# Patient Record
Sex: FEMALE | Race: WHITE | NOT HISPANIC OR LATINO | Employment: FULL TIME | URBAN - NONMETROPOLITAN AREA
[De-identification: names, ages, dates, MRNs, and addresses within clinical notes are randomized per-mention and may not be internally consistent; named-entity substitution may affect disease eponyms.]

---

## 2017-05-19 ENCOUNTER — APPOINTMENT (OUTPATIENT)
Dept: LAB | Facility: HOSPITAL | Age: 32
End: 2017-05-19

## 2017-05-19 DIAGNOSIS — E53.8 B12 DEFICIENCY: ICD-10-CM

## 2017-05-19 DIAGNOSIS — E06.3 HYPOTHYROIDISM DUE TO HASHIMOTO'S THYROIDITIS: ICD-10-CM

## 2017-05-19 DIAGNOSIS — E03.8 HYPOTHYROIDISM DUE TO HASHIMOTO'S THYROIDITIS: ICD-10-CM

## 2017-05-19 DIAGNOSIS — E55.9 VITAMIN D DEFICIENCY: Primary | ICD-10-CM

## 2017-05-19 LAB
25(OH)D3 SERPL-MCNC: 34.7 NG/ML (ref 30–100)
ALBUMIN SERPL-MCNC: 4.5 G/DL (ref 3.4–4.8)
ALBUMIN/GLOB SERPL: 1.4 G/DL (ref 1.1–1.8)
ALP SERPL-CCNC: 53 U/L (ref 38–126)
ALT SERPL W P-5'-P-CCNC: 22 U/L (ref 9–52)
ANION GAP SERPL CALCULATED.3IONS-SCNC: 10 MMOL/L (ref 5–15)
ARTICHOKE IGE QN: 125 MG/DL (ref 1–129)
AST SERPL-CCNC: 31 U/L (ref 14–36)
BASOPHILS # BLD AUTO: 0.02 10*3/MM3 (ref 0–0.2)
BASOPHILS NFR BLD AUTO: 0.4 % (ref 0–2)
BILIRUB SERPL-MCNC: 0.6 MG/DL (ref 0.2–1.3)
BUN BLD-MCNC: 14 MG/DL (ref 7–21)
BUN/CREAT SERPL: 20 (ref 7–25)
CALCIUM SPEC-SCNC: 9.2 MG/DL (ref 8.4–10.2)
CHLORIDE SERPL-SCNC: 100 MMOL/L (ref 95–110)
CHOLEST SERPL-MCNC: 176 MG/DL (ref 0–199)
CO2 SERPL-SCNC: 26 MMOL/L (ref 22–31)
CREAT BLD-MCNC: 0.7 MG/DL (ref 0.5–1)
DEPRECATED RDW RBC AUTO: 40.1 FL (ref 36.4–46.3)
EOSINOPHIL # BLD AUTO: 0.14 10*3/MM3 (ref 0–0.7)
EOSINOPHIL NFR BLD AUTO: 2.7 % (ref 0–7)
ERYTHROCYTE [DISTWIDTH] IN BLOOD BY AUTOMATED COUNT: 12.2 % (ref 11.5–14.5)
GFR SERPL CREATININE-BSD FRML MDRD: 96 ML/MIN/1.73 (ref 60–149)
GLOBULIN UR ELPH-MCNC: 3.3 GM/DL (ref 2.3–3.5)
GLUCOSE BLD-MCNC: 89 MG/DL (ref 60–100)
HCT VFR BLD AUTO: 40.7 % (ref 35–45)
HDLC SERPL-MCNC: 53 MG/DL (ref 60–200)
HGB BLD-MCNC: 13.7 G/DL (ref 12–15.5)
IMM GRANULOCYTES # BLD: 0.01 10*3/MM3 (ref 0–0.02)
IMM GRANULOCYTES NFR BLD: 0.2 % (ref 0–0.5)
LDLC/HDLC SERPL: 2.11 {RATIO} (ref 0–3.22)
LYMPHOCYTES # BLD AUTO: 1.42 10*3/MM3 (ref 0.6–4.2)
LYMPHOCYTES NFR BLD AUTO: 27.4 % (ref 10–50)
MCH RBC QN AUTO: 30.2 PG (ref 26.5–34)
MCHC RBC AUTO-ENTMCNC: 33.7 G/DL (ref 31.4–36)
MCV RBC AUTO: 89.6 FL (ref 80–98)
MONOCYTES # BLD AUTO: 0.35 10*3/MM3 (ref 0–0.9)
MONOCYTES NFR BLD AUTO: 6.7 % (ref 0–12)
NEUTROPHILS # BLD AUTO: 3.25 10*3/MM3 (ref 2–8.6)
NEUTROPHILS NFR BLD AUTO: 62.6 % (ref 37–80)
PLATELET # BLD AUTO: 172 10*3/MM3 (ref 150–450)
PMV BLD AUTO: 13 FL (ref 8–12)
POTASSIUM BLD-SCNC: 4 MMOL/L (ref 3.5–5.1)
PROT SERPL-MCNC: 7.8 G/DL (ref 6.3–8.6)
RBC # BLD AUTO: 4.54 10*6/MM3 (ref 3.77–5.16)
SODIUM BLD-SCNC: 136 MMOL/L (ref 137–145)
TRIGL SERPL-MCNC: 57 MG/DL (ref 20–199)
TSH SERPL DL<=0.05 MIU/L-ACNC: 0.84 MIU/ML (ref 0.46–4.68)
VIT B12 BLD-MCNC: 334 PG/ML (ref 239–931)
WBC NRBC COR # BLD: 5.19 10*3/MM3 (ref 3.2–9.8)

## 2017-05-19 PROCEDURE — 82306 VITAMIN D 25 HYDROXY: CPT | Performed by: INTERNAL MEDICINE

## 2017-05-19 PROCEDURE — 86304 IMMUNOASSAY TUMOR CA 125: CPT | Performed by: INTERNAL MEDICINE

## 2017-05-19 PROCEDURE — 82607 VITAMIN B-12: CPT | Performed by: INTERNAL MEDICINE

## 2017-05-19 PROCEDURE — 85025 COMPLETE CBC W/AUTO DIFF WBC: CPT | Performed by: INTERNAL MEDICINE

## 2017-05-19 PROCEDURE — 80061 LIPID PANEL: CPT | Performed by: INTERNAL MEDICINE

## 2017-05-19 PROCEDURE — 36415 COLL VENOUS BLD VENIPUNCTURE: CPT | Performed by: INTERNAL MEDICINE

## 2017-05-19 PROCEDURE — 84443 ASSAY THYROID STIM HORMONE: CPT | Performed by: INTERNAL MEDICINE

## 2017-05-19 PROCEDURE — 80053 COMPREHEN METABOLIC PANEL: CPT | Performed by: INTERNAL MEDICINE

## 2017-05-22 RX ORDER — LEVONORGESTREL AND ETHINYL ESTRADIOL 0.1-0.02MG
KIT ORAL
Qty: 28 TABLET | Refills: 11 | OUTPATIENT
Start: 2017-05-22

## 2017-05-23 RX ORDER — LEVONORGESTREL AND ETHINYL ESTRADIOL 0.1-0.02MG
1 KIT ORAL DAILY
Qty: 28 TABLET | Refills: 0 | Status: SHIPPED | OUTPATIENT
Start: 2017-05-23 | End: 2017-06-12 | Stop reason: SINTOL

## 2017-05-24 ENCOUNTER — OFFICE VISIT (OUTPATIENT)
Dept: ENDOCRINOLOGY | Facility: CLINIC | Age: 32
End: 2017-05-24

## 2017-05-24 VITALS
DIASTOLIC BLOOD PRESSURE: 70 MMHG | SYSTOLIC BLOOD PRESSURE: 116 MMHG | HEART RATE: 88 BPM | HEIGHT: 64 IN | WEIGHT: 113.8 LBS | BODY MASS INDEX: 19.43 KG/M2

## 2017-05-24 DIAGNOSIS — E53.8 B12 DEFICIENCY: ICD-10-CM

## 2017-05-24 DIAGNOSIS — E55.9 VITAMIN D DEFICIENCY: Primary | ICD-10-CM

## 2017-05-24 DIAGNOSIS — Z12.73 SCREENING FOR OVARIAN CANCER: ICD-10-CM

## 2017-05-24 DIAGNOSIS — E03.8 HYPOTHYROIDISM DUE TO HASHIMOTO'S THYROIDITIS: ICD-10-CM

## 2017-05-24 DIAGNOSIS — E06.3 HYPOTHYROIDISM DUE TO HASHIMOTO'S THYROIDITIS: ICD-10-CM

## 2017-05-24 PROCEDURE — 99213 OFFICE O/P EST LOW 20 MIN: CPT | Performed by: INTERNAL MEDICINE

## 2017-05-24 RX ORDER — LEVOTHYROXINE AND LIOTHYRONINE 57; 13.5 UG/1; UG/1
45 TABLET ORAL DAILY
Qty: 15 TABLET | Refills: 11 | Status: SHIPPED | OUTPATIENT
Start: 2017-05-24 | End: 2018-05-14 | Stop reason: SDUPTHER

## 2017-05-25 LAB — CANCER AG125 SERPL-ACNC: 12.1 U/ML (ref 0–38.1)

## 2017-06-12 ENCOUNTER — PROCEDURE VISIT (OUTPATIENT)
Dept: FAMILY MEDICINE CLINIC | Facility: CLINIC | Age: 32
End: 2017-06-12

## 2017-06-12 VITALS
HEART RATE: 86 BPM | SYSTOLIC BLOOD PRESSURE: 110 MMHG | TEMPERATURE: 98.9 F | WEIGHT: 112.5 LBS | HEIGHT: 64 IN | DIASTOLIC BLOOD PRESSURE: 60 MMHG | BODY MASS INDEX: 19.21 KG/M2

## 2017-06-12 DIAGNOSIS — R10.2 PELVIC PAIN: ICD-10-CM

## 2017-06-12 DIAGNOSIS — Z01.419 NORMAL GYNECOLOGIC EXAMINATION: Primary | ICD-10-CM

## 2017-06-12 DIAGNOSIS — Z80.41 FAMILY HISTORY OF OVARIAN CANCER: ICD-10-CM

## 2017-06-12 DIAGNOSIS — Z30.41 ENCOUNTER FOR SURVEILLANCE OF CONTRACEPTIVE PILLS: ICD-10-CM

## 2017-06-12 DIAGNOSIS — Z12.4 PAP SMEAR FOR CERVICAL CANCER SCREENING: ICD-10-CM

## 2017-06-12 PROCEDURE — 99395 PREV VISIT EST AGE 18-39: CPT | Performed by: NURSE PRACTITIONER

## 2017-06-12 PROCEDURE — 88142 CYTOPATH C/V THIN LAYER: CPT | Performed by: PATHOLOGY

## 2017-06-12 RX ORDER — NORGESTIMATE AND ETHINYL ESTRADIOL 7DAYSX3 28
1 KIT ORAL DAILY
Qty: 28 TABLET | Refills: 12 | Status: SHIPPED | OUTPATIENT
Start: 2017-06-12 | End: 2018-05-17 | Stop reason: SDUPTHER

## 2017-06-12 NOTE — PROGRESS NOTES
Subjective   Mary Ann Mondragon is a 32 y.o. female. Patient here today with complaints of Gynecologic Exam  Patient here today for Pap smear, well woman examination and follow-up on contraception.  She has been using OCPs however most recently has been having headaches and increased cramping with her periods on these birth control pills.  She wants to consider changing them and or possibly getting an IUD.  She reports family history of ovarian cancer in mother.  Does perform SBEs monthly.  Is not currently sexually active but says that when she last had intercourse 2 years ago she did have some pain with intercourse.  She is  0 para 0  0.  Has hypothyroidism and continues to see Dr. Fabiano Branch for this.  She drinks 1 alcoholic beverage per week, denies tobacco or recreational drug use.  Denies breast, uterine, colon cancer in family.  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    Vitals:    17 0927   BP: 110/60   Pulse: 86   Temp: 98.9 °F (37.2 °C)     Past Medical History:   Diagnosis Date   • Abdominal pain, epigastric    • Acquired hypothyroidism    • Acute pharyngitis    • Carbuncle/boil     left axilla   • Contact dermatitis    • Contraception    • Cough    • Dysmenorrhea    • Dysuria    • Encounter for gynecological examination (general) (routine) without abnormal findings    • Epigastric pain    • Fatigue    • GERD (gastroesophageal reflux disease)    • Hashimoto's thyroiditis    • Heartburn    • Increased frequency of urination    • Knee pain    • Multiple joint pain    • Muscle pain    • Nausea    • Neoplasm of ovary    • Pain in wrist     Right wrist.   • Pelvic pain    • Potential Infectious disease contact    • Special examinations and investigations     Gynecological examination   • Surveillance of oral contraception done    • Syncope    • Upper respiratory infection    • Visit for gynecologic examination    • Vitamin D deficiency    • Vitamin deficiency       Family History   Problem Relation Age of Onset   • Ovarian cancer Other    • Stomach cancer Other    • CHRISTIANA disease Other      Past Surgical History:   Procedure Laterality Date   • ESOPHAGOSCOPY / EGD  2015    Normal esophagus. Gastritis found in the stomach. Biopsy taken. Normal duodenum.   • TONSILLECTOMY AND ADENOIDECTOMY     • TYMPANOSTOMY TUBE PLACEMENT       Social History     Social History   • Marital status: Single     Spouse name: N/A   • Number of children: 0   • Years of education: N/A     Occupational History   • Not on file.     Social History Main Topics   • Smoking status: Never Smoker   • Smokeless tobacco: Never Used   • Alcohol use Yes      Comment: Occasionally   • Drug use: No   • Sexual activity: Not currently     Other Topics Concern   • Not on file     Social History Narrative     Sexual History:       OB History   [id number]     Menstrual History:  OB History     [id number]         Menarche age: 10  Patient's last menstrual period was 2017 (exact date).         Gynecologic Exam   The patient's primary symptoms include pelvic pain and vaginal discharge. The patient's pertinent negatives include no genital itching, genital lesions, genital odor, genital rash, missed menses or vaginal bleeding. This is a recurrent problem. The problem occurs intermittently. The problem has been waxing and waning. The pain is mild. The problem affects both sides. She is not pregnant. Associated symptoms include painful intercourse. Pertinent negatives include no abdominal pain, anorexia, back pain, chills, constipation, diarrhea, discolored urine, dysuria, fever, flank pain, frequency, headaches, hematuria, joint pain, joint swelling, nausea, rash, sore throat, urgency or vomiting. The vaginal discharge was thick and yellow. The vaginal bleeding is typical of menses. Sexual activity: Denies having been sexually active for the last 2 years. It is unknown whether or not her partner has an STD. She uses  oral contraceptives for contraception. Her menstrual history has been regular. There is no history of a  section, an ectopic pregnancy, endometriosis, herpes simplex, menorrhagia, metrorrhagia, miscarriage, ovarian cysts, perineal abscess, PID, an STD, a terminated pregnancy or vaginosis. (Complaints of dysmenorrhea)        The following portions of the patient's history were reviewed and updated as appropriate: allergies, current medications, past family history, past medical history, past social history, past surgical history and problem list.    Review of Systems   Constitutional: Negative.  Negative for chills and fever.   HENT: Negative.  Negative for sore throat.    Eyes: Negative.    Respiratory: Negative.    Cardiovascular: Negative.    Gastrointestinal: Negative.  Negative for abdominal pain, anorexia, constipation, diarrhea, nausea and vomiting.   Endocrine: Negative.    Genitourinary: Positive for pelvic pain and vaginal discharge. Negative for dysuria, flank pain, frequency, hematuria, menorrhagia, missed menses and urgency.   Musculoskeletal: Negative.  Negative for back pain and joint pain.   Skin: Negative.  Negative for rash.   Allergic/Immunologic: Negative.    Neurological: Negative.  Negative for headaches.   Hematological: Negative.    Psychiatric/Behavioral: Negative.        Objective   Physical Exam   Constitutional: She is oriented to person, place, and time. She appears well-developed and well-nourished. No distress.   HENT:   Head: Normocephalic and atraumatic.   Eyes: Conjunctivae and EOM are normal. Right eye exhibits no discharge. Left eye exhibits no discharge.   Neck: Normal range of motion. Neck supple. Normal carotid pulses present. Carotid bruit is not present.   Cardiovascular: Normal rate, regular rhythm, normal heart sounds and intact distal pulses.  Exam reveals no gallop and no friction rub.    No murmur heard.  Pulmonary/Chest: Effort normal and breath sounds normal. No  respiratory distress. She has no wheezes. She has no rales. She exhibits no tenderness. Right breast exhibits no inverted nipple, no mass, no nipple discharge, no skin change and no tenderness. Left breast exhibits no inverted nipple, no mass, no nipple discharge, no skin change and no tenderness. Breasts are symmetrical. There is no breast swelling.   Abdominal: Soft. Bowel sounds are normal. She exhibits no distension and no mass. There is no tenderness. There is no rebound and no guarding. No hernia. Hernia confirmed negative in the right inguinal area and confirmed negative in the left inguinal area.   Genitourinary: Rectum normal and uterus normal. Rectal exam shows no external hemorrhoid, no internal hemorrhoid, no fissure, no mass, no tenderness, anal tone normal and guaiac negative stool. No breast tenderness, discharge or bleeding. Pelvic exam was performed with patient supine. There is no rash, tenderness, lesion or injury on the right labia. There is no rash, tenderness, lesion or injury on the left labia. Uterus is not deviated, not enlarged, not fixed and not tender. Cervix exhibits no motion tenderness, no discharge and no friability. Right adnexum displays tenderness. Right adnexum displays no mass and no fullness. Left adnexum displays tenderness. Left adnexum displays no mass and no fullness. No erythema, tenderness or bleeding in the vagina. No foreign body in the vagina. No signs of injury around the vagina. Vaginal discharge found.   Genitourinary Comments: Complains of pain during bimanual examination   Musculoskeletal: Normal range of motion. She exhibits no edema, tenderness or deformity.   Lymphadenopathy:     She has no cervical adenopathy.        Right: No inguinal adenopathy present.        Left: No inguinal adenopathy present.   Neurological: She is alert and oriented to person, place, and time. She has normal reflexes. She displays normal reflexes. No cranial nerve deficit. She exhibits  normal muscle tone. Coordination normal.   Skin: Skin is warm and dry. No rash noted. She is not diaphoretic. No erythema. No pallor.   Psychiatric: She has a normal mood and affect. Her behavior is normal. Judgment and thought content normal.   Nursing note and vitals reviewed.      Assessment/Plan   Mary Ann was seen today for gynecologic exam.    Diagnoses and all orders for this visit:    Normal gynecologic examination    Pap smear for cervical cancer screening  -     Liquid-based Pap Smear, Screening    Family history of ovarian cancer  Comments:  mother,  at age 59  Orders:  -     US Pelvis Complete    Encounter for surveillance of contraceptive pills    Pelvic pain  -     US Pelvis Complete    Other orders  -     norgestimate-ethinyl estradiol (ORTHO TRI-CYCLEN, 28,) 0.18/0.215/0.25 MG-35 MCG per tablet; Take 1 tablet by mouth Daily.             Lab Results (most recent)     Procedure Component Value Units Date/Time    Liquid-based Pap Smear, Screening [[id number]] Collected:  17 1017    Specimen:  ThinPrep Vial from Cervix Updated:  17 1159        Lab Results (most recent)     Procedure Component Value Units Date/Time    Liquid-based Pap Smear, Screening [[id number]] Collected:  17 1017    Specimen:  ThinPrep Vial from Cervix Updated:  17 1159        Pap is obtained and sent to lab for cytology, she will be informed of results. Will change OCPs, RX sent for ortho tri-cyclen. Offered referral to [facility] for IUD but wants to wait on that referral today. Will call back should she change her mind. pelvid US is ordered since she did have pain during bimanual exam and she does have fam hx of ovarian cancer. She will RTC prn or in 1 year for follow up . she is aware and in agreement to this plan. All questions and concerns are addressed with understanding noted. The patient is in agreement to above plan.

## 2017-06-14 LAB
LAB AP CASE REPORT: NORMAL
LAB AP GYN ADDITIONAL INFORMATION: NORMAL
LAB AP GYN OTHER FINDINGS: NORMAL
Lab: NORMAL
PATH INTERP SPEC-IMP: NORMAL
STAT OF ADQ CVX/VAG CYTO-IMP: NORMAL

## 2018-02-23 ENCOUNTER — LAB (OUTPATIENT)
Dept: LAB | Facility: HOSPITAL | Age: 33
End: 2018-02-23

## 2018-02-23 DIAGNOSIS — E06.3 HYPOTHYROIDISM DUE TO HASHIMOTO'S THYROIDITIS: ICD-10-CM

## 2018-02-23 DIAGNOSIS — E55.9 VITAMIN D DEFICIENCY: ICD-10-CM

## 2018-02-23 DIAGNOSIS — E03.8 HYPOTHYROIDISM DUE TO HASHIMOTO'S THYROIDITIS: ICD-10-CM

## 2018-02-23 DIAGNOSIS — E53.8 B12 DEFICIENCY: ICD-10-CM

## 2018-02-23 DIAGNOSIS — Z12.73 SCREENING FOR OVARIAN CANCER: ICD-10-CM

## 2018-02-23 LAB
25(OH)D3 SERPL-MCNC: 28.4 NG/ML (ref 30–100)
ALBUMIN SERPL-MCNC: 4.5 G/DL (ref 3.4–4.8)
ALBUMIN/GLOB SERPL: 1.4 G/DL (ref 1.1–1.8)
ALP SERPL-CCNC: 54 U/L (ref 38–126)
ALT SERPL W P-5'-P-CCNC: 23 U/L (ref 9–52)
ANION GAP SERPL CALCULATED.3IONS-SCNC: 14 MMOL/L (ref 5–15)
ARTICHOKE IGE QN: 124 MG/DL (ref 1–129)
AST SERPL-CCNC: 25 U/L (ref 14–36)
BASOPHILS # BLD AUTO: 0.02 10*3/MM3 (ref 0–0.2)
BASOPHILS NFR BLD AUTO: 0.4 % (ref 0–2)
BILIRUB SERPL-MCNC: 0.3 MG/DL (ref 0.2–1.3)
BUN BLD-MCNC: 11 MG/DL (ref 7–21)
BUN/CREAT SERPL: 19.3 (ref 7–25)
CALCIUM SPEC-SCNC: 9.3 MG/DL (ref 8.4–10.2)
CHLORIDE SERPL-SCNC: 102 MMOL/L (ref 95–110)
CHOLEST SERPL-MCNC: 195 MG/DL (ref 0–199)
CO2 SERPL-SCNC: 26 MMOL/L (ref 22–31)
CREAT BLD-MCNC: 0.57 MG/DL (ref 0.5–1)
DEPRECATED RDW RBC AUTO: 40.2 FL (ref 36.4–46.3)
EOSINOPHIL # BLD AUTO: 0.1 10*3/MM3 (ref 0–0.7)
EOSINOPHIL NFR BLD AUTO: 2.1 % (ref 0–7)
ERYTHROCYTE [DISTWIDTH] IN BLOOD BY AUTOMATED COUNT: 12.2 % (ref 11.5–14.5)
GFR SERPL CREATININE-BSD FRML MDRD: 121 ML/MIN/1.73 (ref 64–149)
GLOBULIN UR ELPH-MCNC: 3.3 GM/DL (ref 2.3–3.5)
GLUCOSE BLD-MCNC: 81 MG/DL (ref 60–100)
HCT VFR BLD AUTO: 40.6 % (ref 35–45)
HDLC SERPL-MCNC: 65 MG/DL (ref 60–200)
HGB BLD-MCNC: 13.6 G/DL (ref 12–15.5)
IMM GRANULOCYTES # BLD: 0.02 10*3/MM3 (ref 0–0.02)
IMM GRANULOCYTES NFR BLD: 0.4 % (ref 0–0.5)
LDLC/HDLC SERPL: 1.81 {RATIO} (ref 0–3.22)
LYMPHOCYTES # BLD AUTO: 1.5 10*3/MM3 (ref 0.6–4.2)
LYMPHOCYTES NFR BLD AUTO: 31.4 % (ref 10–50)
MCH RBC QN AUTO: 30.2 PG (ref 26.5–34)
MCHC RBC AUTO-ENTMCNC: 33.5 G/DL (ref 31.4–36)
MCV RBC AUTO: 90 FL (ref 80–98)
MONOCYTES # BLD AUTO: 0.26 10*3/MM3 (ref 0–0.9)
MONOCYTES NFR BLD AUTO: 5.5 % (ref 0–12)
NEUTROPHILS # BLD AUTO: 2.87 10*3/MM3 (ref 2–8.6)
NEUTROPHILS NFR BLD AUTO: 60.2 % (ref 37–80)
PLATELET # BLD AUTO: 181 10*3/MM3 (ref 150–450)
PMV BLD AUTO: 12.6 FL (ref 8–12)
POTASSIUM BLD-SCNC: 3.8 MMOL/L (ref 3.5–5.1)
PROT SERPL-MCNC: 7.8 G/DL (ref 6.3–8.6)
RBC # BLD AUTO: 4.51 10*6/MM3 (ref 3.77–5.16)
SODIUM BLD-SCNC: 142 MMOL/L (ref 137–145)
TRIGL SERPL-MCNC: 61 MG/DL (ref 20–199)
TSH SERPL DL<=0.05 MIU/L-ACNC: 1.62 MIU/ML (ref 0.46–4.68)
VIT B12 BLD-MCNC: 285 PG/ML (ref 239–931)
WBC NRBC COR # BLD: 4.77 10*3/MM3 (ref 3.2–9.8)

## 2018-02-23 PROCEDURE — 84443 ASSAY THYROID STIM HORMONE: CPT

## 2018-02-23 PROCEDURE — 82306 VITAMIN D 25 HYDROXY: CPT

## 2018-02-23 PROCEDURE — 36415 COLL VENOUS BLD VENIPUNCTURE: CPT

## 2018-02-23 PROCEDURE — 85025 COMPLETE CBC W/AUTO DIFF WBC: CPT

## 2018-02-23 PROCEDURE — 80053 COMPREHEN METABOLIC PANEL: CPT

## 2018-02-23 PROCEDURE — 82607 VITAMIN B-12: CPT

## 2018-02-23 PROCEDURE — 80061 LIPID PANEL: CPT

## 2018-05-14 ENCOUNTER — OFFICE VISIT (OUTPATIENT)
Dept: ENDOCRINOLOGY | Facility: CLINIC | Age: 33
End: 2018-05-14

## 2018-05-14 VITALS
WEIGHT: 111 LBS | DIASTOLIC BLOOD PRESSURE: 58 MMHG | HEART RATE: 65 BPM | HEIGHT: 64 IN | SYSTOLIC BLOOD PRESSURE: 94 MMHG | OXYGEN SATURATION: 98 % | BODY MASS INDEX: 18.95 KG/M2

## 2018-05-14 DIAGNOSIS — E03.8 HYPOTHYROIDISM DUE TO HASHIMOTO'S THYROIDITIS: ICD-10-CM

## 2018-05-14 DIAGNOSIS — E55.9 VITAMIN D DEFICIENCY: Primary | ICD-10-CM

## 2018-05-14 DIAGNOSIS — E06.3 HYPOTHYROIDISM DUE TO HASHIMOTO'S THYROIDITIS: ICD-10-CM

## 2018-05-14 DIAGNOSIS — Z12.73 SCREENING FOR OVARIAN CANCER: ICD-10-CM

## 2018-05-14 DIAGNOSIS — E53.8 B12 DEFICIENCY: ICD-10-CM

## 2018-05-14 PROCEDURE — 99214 OFFICE O/P EST MOD 30 MIN: CPT | Performed by: INTERNAL MEDICINE

## 2018-05-14 RX ORDER — LEVOTHYROXINE AND LIOTHYRONINE 57; 13.5 UG/1; UG/1
45 TABLET ORAL DAILY
Qty: 15 TABLET | Refills: 11 | Status: SHIPPED | OUTPATIENT
Start: 2018-05-14 | End: 2019-05-16 | Stop reason: SDUPTHER

## 2018-05-14 NOTE — PROGRESS NOTES
Mary Ann Mondragon is a 33 y.o. female who presents for  evaluation of   Chief Complaint   Patient presents with   • Follow-up     no labs done yet       Referring provider    No referring provider defined for this encounter.    Primary Care Provider    Kaylee Osullivan, [facility]    33 yo comes for fu  Hypothyroidism, constant, controlled, mild severity   She is compliant     Past Medical History:   Diagnosis Date   • Abdominal pain, epigastric    • Acquired hypothyroidism    • Acute pharyngitis    • Carbuncle/boil     left axilla   • Contact dermatitis    • Contraception    • Cough    • Dysmenorrhea    • Dysuria    • Encounter for gynecological examination (general) (routine) without abnormal findings    • Epigastric pain    • Fatigue    • GERD (gastroesophageal reflux disease)    • Hashimoto's thyroiditis    • Heartburn    • Increased frequency of urination    • Knee pain    • Multiple joint pain    • Muscle pain    • Nausea    • Neoplasm of ovary    • Pain in wrist     Right wrist.   • Pelvic pain    • Potential Infectious disease contact    • Special examinations and investigations     Gynecological examination   • Surveillance of oral contraception done    • Syncope    • Upper respiratory infection    • Visit for gynecologic examination    • Vitamin D deficiency    • Vitamin deficiency      Family History   Problem Relation Age of Onset   • Ovarian cancer Other    • Stomach cancer Other    • CHRISTIANA disease Other      Social History   Substance Use Topics   • Smoking status: Never Smoker   • Smokeless tobacco: Never Used   • Alcohol use Yes      Comment: Occasionally         Current Outpatient Prescriptions:   •  norgestimate-ethinyl estradiol (ORTHO TRI-CYCLEN, 28,) 0.18/0.215/0.25 MG-35 MCG per tablet, Take 1 tablet by mouth Daily., Disp: 28 tablet, Rfl: 12  •  promethazine (PHENERGAN) 25 MG tablet, Take 25 mg by mouth Every 6 (Six) Hours As Needed., Disp: , Rfl:   •  Thyroid 90 MG PO tablet, Take 0.5 tablets by  "mouth Daily., Disp: 15 tablet, Rfl: 11    Review of Systems    Review of Systems   Constitutional: Negative for activity change, appetite change, chills, diaphoresis, fatigue, fever and unexpected weight change.   HENT: Negative for congestion, dental problem, drooling, ear discharge, ear pain, facial swelling, mouth sores, postnasal drip, rhinorrhea, sinus pressure, sore throat, tinnitus, trouble swallowing and voice change.    Eyes: Negative for photophobia, pain, discharge, redness, itching and visual disturbance.   Respiratory: Negative for apnea, cough, choking, chest tightness, shortness of breath, wheezing and stridor.    Cardiovascular: Negative for chest pain, palpitations and leg swelling.   Gastrointestinal: Negative for abdominal distention, abdominal pain, constipation, diarrhea, nausea and vomiting.   Endocrine: Negative for cold intolerance, heat intolerance, polydipsia, polyphagia and polyuria.   Genitourinary: Negative for decreased urine volume, difficulty urinating, dysuria, flank pain, frequency, hematuria and urgency.   Musculoskeletal: Negative for arthralgias, back pain, gait problem, joint swelling, myalgias, neck pain and neck stiffness.   Skin: Negative for color change, pallor, rash and wound.   Allergic/Immunologic: Negative for immunocompromised state.   Neurological: Negative for dizziness, tremors, seizures, syncope, facial asymmetry, speech difficulty, weakness, light-headedness, numbness and headaches.   Hematological: Negative for adenopathy.   Psychiatric/Behavioral: Negative for agitation, behavioral problems, confusion, decreased concentration, dysphoric mood, hallucinations, self-injury, sleep disturbance and suicidal ideas. The patient is not nervous/anxious and is not hyperactive.         Objective:   BP 94/58 (BP Location: Left arm, Patient Position: Sitting, Cuff Size: Large Adult)   Pulse 65   Ht 162.6 cm (64\")   Wt 50.3 kg (111 lb)   SpO2 98%   BMI 19.05 kg/m² "     Physical Exam   Constitutional: She is oriented to person, place, and time. She appears well-developed.   HENT:   Head: Normocephalic.   Right Ear: External ear normal.   Left Ear: External ear normal.   Nose: Nose normal.   Eyes: Conjunctivae and EOM are normal. No scleral icterus.   Neck: Normal range of motion. Neck supple. No tracheal deviation present. No thyromegaly present.   Cardiovascular: Normal rate, regular rhythm, normal heart sounds and intact distal pulses.  Exam reveals no gallop and no friction rub.    No murmur heard.  Pulmonary/Chest: Effort normal and breath sounds normal. No stridor. No respiratory distress. She has no wheezes. She has no rales. She exhibits no tenderness.   Abdominal: Soft. Bowel sounds are normal. She exhibits no distension and no mass. There is no tenderness. There is no rebound and no guarding.   Musculoskeletal: Normal range of motion. She exhibits no tenderness or deformity.   Lymphadenopathy:     She has no cervical adenopathy.   Neurological: She is alert and oriented to person, place, and time. She displays normal reflexes. She exhibits normal muscle tone. Coordination normal.   Skin: No rash noted. No erythema. No pallor.   Psychiatric: She has a normal mood and affect. Her behavior is normal. Judgment and thought content normal.       Lab Review    Results for orders placed or performed in visit on 02/23/18   CBC Auto Differential   Result Value Ref Range    WBC 4.77 3.20 - 9.80 10*3/mm3    RBC 4.51 3.77 - 5.16 10*6/mm3    Hemoglobin 13.6 12.0 - 15.5 g/dL    Hematocrit 40.6 35.0 - 45.0 %    MCV 90.0 80.0 - 98.0 fL    MCH 30.2 26.5 - 34.0 pg    MCHC 33.5 31.4 - 36.0 g/dL    RDW 12.2 11.5 - 14.5 %    RDW-SD 40.2 36.4 - 46.3 fl    MPV 12.6 (H) 8.0 - 12.0 fL    Platelets 181 150 - 450 10*3/mm3    Neutrophil % 60.2 37.0 - 80.0 %    Lymphocyte % 31.4 10.0 - 50.0 %    Monocyte % 5.5 0.0 - 12.0 %    Eosinophil % 2.1 0.0 - 7.0 %    Basophil % 0.4 0.0 - 2.0 %    Immature  Grans % 0.4 0.0 - 0.5 %    Neutrophils, Absolute 2.87 2.00 - 8.60 10*3/mm3    Lymphocytes, Absolute 1.50 0.60 - 4.20 10*3/mm3    Monocytes, Absolute 0.26 0.00 - 0.90 10*3/mm3    Eosinophils, Absolute 0.10 0.00 - 0.70 10*3/mm3    Basophils, Absolute 0.02 0.00 - 0.20 10*3/mm3    Immature Grans, Absolute 0.02 0.00 - 0.02 10*3/mm3   Comprehensive Metabolic Panel   Result Value Ref Range    Glucose 81 60 - 100 mg/dL    BUN 11 7 - 21 mg/dL    Creatinine 0.57 0.50 - 1.00 mg/dL    Sodium 142 137 - 145 mmol/L    Potassium 3.8 3.5 - 5.1 mmol/L    Chloride 102 95 - 110 mmol/L    CO2 26.0 22.0 - 31.0 mmol/L    Calcium 9.3 8.4 - 10.2 mg/dL    Total Protein 7.8 6.3 - 8.6 g/dL    Albumin 4.50 3.40 - 4.80 g/dL    ALT (SGPT) 23 9 - 52 U/L    AST (SGOT) 25 14 - 36 U/L    Alkaline Phosphatase 54 38 - 126 U/L    Total Bilirubin 0.3 0.2 - 1.3 mg/dL    eGFR Non  Amer 121 64 - 149 mL/min/1.73    Globulin 3.3 2.3 - 3.5 gm/dL    A/G Ratio 1.4 1.1 - 1.8 g/dL    BUN/Creatinine Ratio 19.3 7.0 - 25.0    Anion Gap 14.0 5.0 - 15.0 mmol/L   TSH   Result Value Ref Range    TSH 1.620 0.460 - 4.680 mIU/mL   Vitamin B12   Result Value Ref Range    Vitamin B-12 285 239 - 931 pg/mL   Vitamin D 25 Hydroxy   Result Value Ref Range    25 Hydroxy, Vitamin D 28.4 (L) 30.0 - 100.0 ng/ml   Lipid Panel   Result Value Ref Range    Total Cholesterol 195 0 - 199 mg/dL    Triglycerides 61 20 - 199 mg/dL    HDL Cholesterol 65 60 - 200 mg/dL    LDL Cholesterol  124 1 - 129 mg/dL    LDL/HDL Ratio 1.81 0.00 - 3.22           Assessment/Plan       ICD-10-CM ICD-9-CM   1. Vitamin D deficiency E55.9 268.9   2. B12 deficiency E53.8 266.2   3. Hypothyroidism due to Hashimoto's thyroiditis E03.8 244.8    E06.3 245.2   4. Screening for ovarian cancer Z12.73 V76.46         Bone Health    Lab Results   Component Value Date    CALCIUM 9.3 02/23/2018    [id number] 28.4 (L) 02/23/2018     Do otc 1000 u daily     Thyroid Health    Lab Results   Component Value Date    TSH 1.620  02/23/2018    TSH 0.840 05/19/2017    TSH 1.29 05/09/2016       Lab Results   Component Value Date    FREET4 0.84 09/19/2014    FREET4 0.98 03/21/2014       Lab Results   Component Value Date    A2CTVJA 276 (H) 09/19/2014    C7GJKXZ 242 (H) 03/21/2014       Or armour 90 mg ,taking half a tab       Other Diabetes Related Aspects       Lab Results   Component Value Date    [id number] 285 02/23/2018           Last celiac panel     Lab Results   Component Value Date    GDPABIGA 6 05/09/2016    TTRANSGLIGA <2 05/09/2016     05/09/2016       Screen for ovarian cancer, jessica has history     I reviewed and summarized records from [facility] from 2017 and I reviewed / ordered labs.     No orders of the defined types were placed in this encounter.        A copy of my note was sent to [facility]    Please see my above opinion and suggestions.

## 2018-05-17 RX ORDER — NORGESTIMATE-ETHINYL ESTRADIOL 7DAYSX3 28
1 TABLET ORAL DAILY
Qty: 28 TABLET | Refills: 0 | Status: SHIPPED | OUTPATIENT
Start: 2018-05-17 | End: 2018-07-02 | Stop reason: SDUPTHER

## 2018-05-18 ENCOUNTER — LAB (OUTPATIENT)
Dept: LAB | Facility: HOSPITAL | Age: 33
End: 2018-05-18

## 2018-05-18 DIAGNOSIS — E06.3 HYPOTHYROIDISM DUE TO HASHIMOTO'S THYROIDITIS: ICD-10-CM

## 2018-05-18 DIAGNOSIS — E03.8 HYPOTHYROIDISM DUE TO HASHIMOTO'S THYROIDITIS: ICD-10-CM

## 2018-05-18 DIAGNOSIS — E55.9 VITAMIN D DEFICIENCY: ICD-10-CM

## 2018-05-18 DIAGNOSIS — E53.8 B12 DEFICIENCY: ICD-10-CM

## 2018-05-18 LAB
ALBUMIN SERPL-MCNC: 4.3 G/DL (ref 3.4–4.8)
ALBUMIN/GLOB SERPL: 1.2 G/DL (ref 1.1–1.8)
ALP SERPL-CCNC: 48 U/L (ref 38–126)
ALT SERPL W P-5'-P-CCNC: 22 U/L (ref 9–52)
ANION GAP SERPL CALCULATED.3IONS-SCNC: 10 MMOL/L (ref 5–15)
AST SERPL-CCNC: 41 U/L (ref 14–36)
BASOPHILS # BLD AUTO: 0.02 10*3/MM3 (ref 0–0.2)
BASOPHILS NFR BLD AUTO: 0.4 % (ref 0–2)
BILIRUB SERPL-MCNC: 0.4 MG/DL (ref 0.2–1.3)
BUN BLD-MCNC: 19 MG/DL (ref 7–21)
BUN/CREAT SERPL: 31.1 (ref 7–25)
CALCIUM SPEC-SCNC: 9.5 MG/DL (ref 8.4–10.2)
CHLORIDE SERPL-SCNC: 102 MMOL/L (ref 95–110)
CO2 SERPL-SCNC: 29 MMOL/L (ref 22–31)
CREAT BLD-MCNC: 0.61 MG/DL (ref 0.5–1)
DEPRECATED RDW RBC AUTO: 41.3 FL (ref 36.4–46.3)
EOSINOPHIL # BLD AUTO: 0.16 10*3/MM3 (ref 0–0.7)
EOSINOPHIL NFR BLD AUTO: 3.2 % (ref 0–7)
ERYTHROCYTE [DISTWIDTH] IN BLOOD BY AUTOMATED COUNT: 12.4 % (ref 11.5–14.5)
GFR SERPL CREATININE-BSD FRML MDRD: 112 ML/MIN/1.73 (ref 64–149)
GLOBULIN UR ELPH-MCNC: 3.5 GM/DL (ref 2.3–3.5)
GLUCOSE BLD-MCNC: 85 MG/DL (ref 60–100)
HCT VFR BLD AUTO: 41.1 % (ref 35–45)
HGB BLD-MCNC: 13.7 G/DL (ref 12–15.5)
IMM GRANULOCYTES # BLD: 0.01 10*3/MM3 (ref 0–0.02)
IMM GRANULOCYTES NFR BLD: 0.2 % (ref 0–0.5)
LYMPHOCYTES # BLD AUTO: 1.9 10*3/MM3 (ref 0.6–4.2)
LYMPHOCYTES NFR BLD AUTO: 38.5 % (ref 10–50)
MCH RBC QN AUTO: 30.2 PG (ref 26.5–34)
MCHC RBC AUTO-ENTMCNC: 33.3 G/DL (ref 31.4–36)
MCV RBC AUTO: 90.7 FL (ref 80–98)
MONOCYTES # BLD AUTO: 0.35 10*3/MM3 (ref 0–0.9)
MONOCYTES NFR BLD AUTO: 7.1 % (ref 0–12)
NEUTROPHILS # BLD AUTO: 2.49 10*3/MM3 (ref 2–8.6)
NEUTROPHILS NFR BLD AUTO: 50.6 % (ref 37–80)
PLATELET # BLD AUTO: 189 10*3/MM3 (ref 150–450)
PMV BLD AUTO: 12.6 FL (ref 8–12)
POTASSIUM BLD-SCNC: 4.5 MMOL/L (ref 3.5–5.1)
PROT SERPL-MCNC: 7.8 G/DL (ref 6.3–8.6)
RBC # BLD AUTO: 4.53 10*6/MM3 (ref 3.77–5.16)
SODIUM BLD-SCNC: 141 MMOL/L (ref 137–145)
T4 FREE SERPL-MCNC: 0.64 NG/DL (ref 0.78–2.19)
TSH SERPL DL<=0.05 MIU/L-ACNC: 2.02 MIU/ML (ref 0.46–4.68)
WBC NRBC COR # BLD: 4.93 10*3/MM3 (ref 3.2–9.8)

## 2018-05-18 PROCEDURE — 86304 IMMUNOASSAY TUMOR CA 125: CPT | Performed by: INTERNAL MEDICINE

## 2018-05-18 PROCEDURE — 80053 COMPREHEN METABOLIC PANEL: CPT

## 2018-05-18 PROCEDURE — 84443 ASSAY THYROID STIM HORMONE: CPT

## 2018-05-18 PROCEDURE — 84481 FREE ASSAY (FT-3): CPT

## 2018-05-18 PROCEDURE — 84439 ASSAY OF FREE THYROXINE: CPT

## 2018-05-18 PROCEDURE — 36415 COLL VENOUS BLD VENIPUNCTURE: CPT

## 2018-05-18 PROCEDURE — 85025 COMPLETE CBC W/AUTO DIFF WBC: CPT

## 2018-05-19 DIAGNOSIS — Z12.73 SCREENING FOR OVARIAN CANCER: ICD-10-CM

## 2018-05-19 DIAGNOSIS — E55.9 VITAMIN D DEFICIENCY: Primary | ICD-10-CM

## 2018-05-19 DIAGNOSIS — E53.8 B12 DEFICIENCY: ICD-10-CM

## 2018-05-19 DIAGNOSIS — E06.3 HYPOTHYROIDISM DUE TO HASHIMOTO'S THYROIDITIS: ICD-10-CM

## 2018-05-19 DIAGNOSIS — E03.8 HYPOTHYROIDISM DUE TO HASHIMOTO'S THYROIDITIS: ICD-10-CM

## 2018-05-19 LAB
CANCER AG125 SERPL-ACNC: 12.8 U/ML (ref 0–38.1)
T3FREE SERPL-MCNC: 3.4 PG/ML (ref 2–4.4)

## 2018-06-11 RX ORDER — NORGESTIMATE-ETHINYL ESTRADIOL 7DAYSX3 28
1 TABLET ORAL DAILY
Qty: 28 TABLET | Refills: 0 | OUTPATIENT
Start: 2018-06-11 | End: 2019-06-11

## 2018-07-02 ENCOUNTER — OFFICE VISIT (OUTPATIENT)
Dept: FAMILY MEDICINE CLINIC | Facility: CLINIC | Age: 33
End: 2018-07-02

## 2018-07-02 ENCOUNTER — RESULTS ENCOUNTER (OUTPATIENT)
Dept: FAMILY MEDICINE CLINIC | Facility: CLINIC | Age: 33
End: 2018-07-02

## 2018-07-02 VITALS
DIASTOLIC BLOOD PRESSURE: 62 MMHG | HEIGHT: 64 IN | SYSTOLIC BLOOD PRESSURE: 110 MMHG | BODY MASS INDEX: 18.51 KG/M2 | WEIGHT: 108.4 LBS | HEART RATE: 58 BPM

## 2018-07-02 DIAGNOSIS — Z12.11 COLON CANCER SCREENING: ICD-10-CM

## 2018-07-02 DIAGNOSIS — Z01.419 WELL WOMAN EXAM WITH ROUTINE GYNECOLOGICAL EXAM: Primary | ICD-10-CM

## 2018-07-02 DIAGNOSIS — Z12.4 PAP SMEAR FOR CERVICAL CANCER SCREENING: ICD-10-CM

## 2018-07-02 PROCEDURE — 99395 PREV VISIT EST AGE 18-39: CPT | Performed by: NURSE PRACTITIONER

## 2018-07-02 RX ORDER — PROMETHAZINE HYDROCHLORIDE 25 MG/1
25 TABLET ORAL EVERY 6 HOURS PRN
Qty: 20 TABLET | Refills: 0 | Status: SHIPPED | OUTPATIENT
Start: 2018-07-02 | End: 2019-06-10

## 2018-07-02 RX ORDER — NORGESTIMATE AND ETHINYL ESTRADIOL 7DAYSX3 28
1 KIT ORAL DAILY
Qty: 28 TABLET | Refills: 12 | Status: SHIPPED | OUTPATIENT
Start: 2018-07-02 | End: 2018-07-02 | Stop reason: SDUPTHER

## 2018-07-02 RX ORDER — NORGESTIMATE AND ETHINYL ESTRADIOL 7DAYSX3 28
1 KIT ORAL DAILY
Qty: 28 TABLET | Refills: 11 | Status: SHIPPED | OUTPATIENT
Start: 2018-07-02 | End: 2019-05-12 | Stop reason: SDUPTHER

## 2018-07-02 NOTE — PROGRESS NOTES
Subjective   Mary Ann Mondragon is a 33 y.o. female. Patient here today with complaints of Gynecologic Exam  Patient here today for well woman examination with Pap.  LMP 6/13/18.  Reports menarche at age 11.  Has family history of ovarian cancer in mother, colon cancer in great grandfather.  Denies being sexually active.  Requests cologard due to family history.  Reports mole on right breast she would like to have evaluated.  Continues on OCPs which is working well, tolerating well and she needs refills on this today.  Patient exercises daily, runs daily and bikes as well.  Reports good appetite.    Vitals:    07/02/18 0903   BP: 110/62   Pulse: 58     Past Medical History:   Diagnosis Date   • Abdominal pain, epigastric    • Acquired hypothyroidism    • Acute pharyngitis    • Carbuncle/boil     left axilla   • Contact dermatitis    • Contraception    • Cough    • Dysmenorrhea    • Dysuria    • Encounter for gynecological examination (general) (routine) without abnormal findings    • Epigastric pain    • Fatigue    • GERD (gastroesophageal reflux disease)    • Hashimoto's thyroiditis    • Heartburn    • Increased frequency of urination    • Knee pain    • Multiple joint pain    • Muscle pain    • Nausea    • Neoplasm of ovary    • Pain in wrist     Right wrist.   • Pelvic pain    • Potential Infectious disease contact    • Special examinations and investigations     Gynecological examination   • Surveillance of oral contraception done    • Syncope    • Upper respiratory infection    • Visit for gynecologic examination    • Vitamin D deficiency    • Vitamin deficiency      Family History   Problem Relation Age of Onset   • Ovarian cancer Other    • Stomach cancer Other    • CHRISTIANA disease Other      Past Surgical History:   Procedure Laterality Date   • ESOPHAGOSCOPY / EGD  06/08/2015    Normal esophagus. Gastritis found in the stomach. Biopsy taken. Normal duodenum.   • TONSILLECTOMY AND ADENOIDECTOMY     •  TYMPANOSTOMY TUBE PLACEMENT       Social History     Social History   • Marital status: Single     Spouse name: N/A   • Number of children: 0   • Years of education: Teaches college english      Occupational History   • Not on file.     Social History Main Topics   • Smoking status: Never Smoker   • Smokeless tobacco: Never Used   • Alcohol use Yes      Comment: Occasionally   • Drug use: denies   • Sexual activity: No     Other Topics Concern   • Not on file     Social History Narrative   • No narrative on file     Sexual History:       OB History   [id number]     Menstrual History:  OB History     [id number]         Menarche age: 11  Patient's last menstrual period was 2018 (approximate).         Gynecologic Exam   The patient's primary symptoms include a genital rash and vaginal discharge. The patient's pertinent negatives include no genital itching, genital lesions, genital odor, missed menses, pelvic pain or vaginal bleeding. This is a recurrent problem. The problem occurs intermittently. The patient is experiencing no pain. She is not pregnant. Associated symptoms include nausea (has off and on ). Pertinent negatives include no abdominal pain, anorexia, back pain, chills, constipation, diarrhea, discolored urine, dysuria, fever, flank pain, frequency, hematuria, joint pain, joint swelling, painful intercourse, rash, sore throat, urgency or vomiting. The vaginal discharge was yellow. The vaginal bleeding is typical of menses. She has not been passing clots. She has not been passing tissue. She is not sexually active. She uses oral contraceptives for contraception. Her menstrual history has been regular. There is no history of a  section, an ectopic pregnancy, an STD or a terminated pregnancy.        The following portions of the patient's history were reviewed and updated as appropriate: allergies, current medications, past family history, past medical history, past social history, past surgical history  and problem list.    Review of Systems   Constitutional: Negative.  Negative for chills and fever.   HENT: Negative.  Negative for sore throat.    Eyes: Negative.    Respiratory: Negative.    Cardiovascular: Negative.    Gastrointestinal: Positive for nausea (has off and on ). Negative for abdominal pain, anorexia, constipation, diarrhea and vomiting.   Endocrine: Negative.    Genitourinary: Positive for vaginal discharge. Negative for dysuria, flank pain, frequency, hematuria, missed menses, pelvic pain and urgency.   Musculoskeletal: Negative.  Negative for back pain and joint pain.   Skin: Negative.  Negative for rash.   Allergic/Immunologic: Negative.    Neurological: Negative.    Hematological: Negative.    Psychiatric/Behavioral: Negative.        Objective   Physical Exam   Constitutional: She is oriented to person, place, and time. She appears well-developed and well-nourished. No distress.   HENT:   Head: Normocephalic and atraumatic.   Nose: Nose normal.   Mouth/Throat: Oropharynx is clear and moist.   Eyes: Conjunctivae and EOM are normal. Right eye exhibits no discharge. Left eye exhibits no discharge.   Neck: Normal range of motion. Neck supple. No thyromegaly present.   Cardiovascular: Normal rate, regular rhythm, normal heart sounds and intact distal pulses.  Exam reveals no gallop and no friction rub.    No murmur heard.  Pulmonary/Chest: Effort normal and breath sounds normal. No respiratory distress. She has no wheezes. She has no rales. She exhibits no tenderness. Right breast exhibits no inverted nipple, no mass, no nipple discharge, no skin change and no tenderness. Left breast exhibits no inverted nipple, no mass, no nipple discharge, no skin change and no tenderness. Breasts are symmetrical. There is no breast swelling.       Mole, lateral R breast, not itching, bleeding, not changing in color/size   Abdominal: Soft. Bowel sounds are normal. She exhibits no distension and no mass. There is no  tenderness. There is no rebound and no guarding. No hernia. Hernia confirmed negative in the right inguinal area and confirmed negative in the left inguinal area.   Genitourinary: Uterus normal. No breast tenderness, discharge or bleeding. Pelvic exam was performed with patient supine. There is no rash, tenderness, lesion or injury on the right labia. There is no rash, tenderness, lesion or injury on the left labia. Uterus is not deviated, not enlarged, not fixed and not tender. Cervix exhibits no motion tenderness, no discharge and no friability. Right adnexum displays no mass, no tenderness and no fullness. Left adnexum displays no mass, no tenderness and no fullness. No erythema, tenderness or bleeding in the vagina. No foreign body in the vagina. No signs of injury around the vagina. Vaginal discharge found.   Genitourinary Comments: Rectal exam deferred today, copious amt of yellowish, thick discharge noted on exam, exam limited due to pt being uncomfortable , not due to pain however, pt not able to relax during exam even with small speculum. Pt reports not sexually active currently    Musculoskeletal: Normal range of motion. She exhibits no edema, tenderness or deformity.   Lymphadenopathy:     She has no cervical adenopathy.        Right: No inguinal adenopathy present.        Left: No inguinal adenopathy present.   Neurological: She is alert and oriented to person, place, and time. She has normal reflexes. She displays normal reflexes. No cranial nerve deficit. She exhibits normal muscle tone. Coordination normal.   Skin: Skin is warm and dry. No rash noted. She is not diaphoretic. No erythema. No pallor.   Psychiatric: She has a normal mood and affect. Her behavior is normal. Judgment and thought content normal.   Nursing note and vitals reviewed.      Assessment/Plan   Mary Ann was seen today for gynecologic exam.    Diagnoses and all orders for this visit:    Well woman exam with routine gynecological  exam    Pap smear for cervical cancer screening  -     Liquid-based Pap Smear, Screening    Colon cancer screening  -     Cologuard - Stool, Per Rectum; Future    Other orders  -     Discontinue: norgestimate-ethinyl estradiol (TRINESSA, 28,) 0.18/0.215/0.25 MG-35 MCG per tablet; Take 1 tablet by mouth Daily.  -     promethazine (PHENERGAN) 25 MG tablet; Take 1 tablet by mouth Every 6 (Six) Hours As Needed for Nausea or Vomiting.  -     norgestimate-ethinyl estradiol (TRINESSA, 28,) 0.18/0.215/0.25 MG-35 MCG per tablet; Take 1 tablet by mouth Daily.    Pap is obtained and the left for cytology.  She'll be informed of results, I will treat vaginal discharge/Pap accordingly depending on test results   I will refill OCPs for her and Phenergan for her as above.  She will follow-up in one year here for recheck or sooner as needed   She is aware and is in agreement to this plan   cologard is ordered for her per her request due to family history of ovarian cancer and colon cancer.    Patient's Body mass index is 18.61 kg/m². BMI is underweight.   All questions and concerns are addressed with understanding noted.

## 2018-07-05 ENCOUNTER — TELEPHONE (OUTPATIENT)
Dept: FAMILY MEDICINE CLINIC | Facility: CLINIC | Age: 33
End: 2018-07-05

## 2018-07-05 LAB
LAB AP CASE REPORT: NORMAL
LAB AP GYN ADDITIONAL INFORMATION: NORMAL
PATH INTERP SPEC-IMP: NORMAL
STAT OF ADQ CVX/VAG CYTO-IMP: NORMAL

## 2018-10-18 ENCOUNTER — OFFICE VISIT (OUTPATIENT)
Dept: FAMILY MEDICINE CLINIC | Facility: CLINIC | Age: 33
End: 2018-10-18

## 2018-10-18 VITALS
HEIGHT: 64 IN | DIASTOLIC BLOOD PRESSURE: 66 MMHG | OXYGEN SATURATION: 100 % | HEART RATE: 67 BPM | BODY MASS INDEX: 19.36 KG/M2 | SYSTOLIC BLOOD PRESSURE: 114 MMHG | WEIGHT: 113.4 LBS

## 2018-10-18 DIAGNOSIS — R07.89 CHEST WALL PAIN: Primary | ICD-10-CM

## 2018-10-18 DIAGNOSIS — R00.2 PALPITATIONS: ICD-10-CM

## 2018-10-18 DIAGNOSIS — R00.0 TACHYCARDIA: ICD-10-CM

## 2018-10-18 PROCEDURE — 93000 ELECTROCARDIOGRAM COMPLETE: CPT | Performed by: NURSE PRACTITIONER

## 2018-10-18 PROCEDURE — 99213 OFFICE O/P EST LOW 20 MIN: CPT | Performed by: NURSE PRACTITIONER

## 2018-10-22 DIAGNOSIS — R00.2 PALPITATIONS: Primary | ICD-10-CM

## 2018-10-22 PROCEDURE — 93000 ELECTROCARDIOGRAM COMPLETE: CPT | Performed by: NURSE PRACTITIONER

## 2018-10-25 NOTE — PROGRESS NOTES
Subjective   Mary Ann Mondragon is a 33 y.o. female. Patient here today with complaints of Rapid Heart Rate and Chest Pain  Patient here today relating that she woke up this morning with her heart racing did not sleep well last night, heart rate was in the 90s to low 100s for about 30 minutes she recorded this on her fit bed.  She only drinks approximately 1-1-1/2 cups of coffee per day, during the episode she felt her chest was tight but she rested and was taking the breast and her complaints went away on their own.  She does see Dr. Mario for Hashimoto's thyroiditis.  Most recent thyroid labs were drawn in May 2018    Vitals:    10/18/18 1333   BP: 114/66   Pulse: 67   SpO2: 100%     Past Medical History:   Diagnosis Date   • Abdominal pain, epigastric    • Acquired hypothyroidism    • Acute pharyngitis    • Carbuncle/boil     left axilla   • Contact dermatitis    • Contraception    • Cough    • Dysmenorrhea    • Dysuria    • Encounter for gynecological examination (general) (routine) without abnormal findings    • Epigastric pain    • Fatigue    • GERD (gastroesophageal reflux disease)    • Hashimoto's thyroiditis    • Heartburn    • Increased frequency of urination    • Knee pain    • Multiple joint pain    • Muscle pain    • Nausea    • Neoplasm of ovary    • Pain in wrist     Right wrist.   • Pelvic pain    • Potential Infectious disease contact    • Special examinations and investigations     Gynecological examination   • Surveillance of oral contraception done    • Syncope    • Upper respiratory infection    • Visit for gynecologic examination    • Vitamin D deficiency    • Vitamin deficiency      Palpitations    This is a new problem. The current episode started yesterday. The problem occurs rarely. The problem has been resolved. On average, each episode lasts 30 minutes. The symptoms are aggravated by caffeine and thyroid drugs (OCPs). Associated symptoms include chest fullness (Resolved at  present), an irregular heartbeat (Resolved at present) and shortness of breath (Resolved at present). Pertinent negatives include no anxiety, chest pain, coughing, diaphoresis, dizziness, fever, malaise/fatigue, nausea, near-syncope, numbness, syncope, vomiting or weakness. She has tried bed rest for the symptoms. The treatment provided mild relief. There are no known risk factors. Her past medical history is significant for hyperthyroidism. There is no history of drug use.        The following portions of the patient's history were reviewed and updated as appropriate: allergies, current medications, past family history, past medical history, past social history, past surgical history and problem list.    Review of Systems   Constitutional: Negative.  Negative for diaphoresis, fever and malaise/fatigue.   HENT: Negative.    Eyes: Negative.    Respiratory: Positive for shortness of breath (Resolved at present). Negative for cough.    Cardiovascular: Positive for palpitations. Negative for chest pain, syncope and near-syncope.   Gastrointestinal: Negative.  Negative for nausea and vomiting.   Endocrine: Negative.    Genitourinary: Negative.    Musculoskeletal: Negative.    Skin: Negative.    Allergic/Immunologic: Negative.    Neurological: Negative.  Negative for dizziness, weakness and numbness.   Hematological: Negative.    Psychiatric/Behavioral: Negative.  The patient is not nervous/anxious.        Objective     Physical Exam   Constitutional: She is oriented to person, place, and time. She appears well-developed and well-nourished. No distress.   HENT:   Head: Normocephalic and atraumatic.   Neck: Normal carotid pulses present. Carotid bruit is not present.   Cardiovascular: Normal rate, regular rhythm, normal heart sounds and intact distal pulses.  Exam reveals no gallop and no friction rub.    No murmur heard.  Pulmonary/Chest: Effort normal and breath sounds normal. No respiratory distress. She has no wheezes.  She has no rales.   Musculoskeletal: She exhibits no edema.   Neurological: She is alert and oriented to person, place, and time.   Skin: Skin is warm and dry. No rash noted. She is not diaphoretic. No erythema. No pallor.   Psychiatric: She has a normal mood and affect. Her behavior is normal.   Nursing note and vitals reviewed.      Assessment/Plan   Mary Ann was seen today for rapid heart rate and chest pain.    Diagnoses and all orders for this visit:    Chest wall pain  -     XR Chest 2 View    Tachycardia  -     XR Chest 2 View    Palpitations    EKG completed in office, chest x-ray obtained and she'll be informed of results   Labs reviewed with her and she will follow-up with endocrinology for further evaluation  Advised to return here, urgent care, ER if symptoms should return   Advised decrease caffeine use, advised adequate sleep/rest   She is aware and is in agreement to this plan   All questions and concerns are addressed with understanding noted.     Procedures

## 2018-11-05 ENCOUNTER — OFFICE VISIT (OUTPATIENT)
Dept: FAMILY MEDICINE CLINIC | Facility: CLINIC | Age: 33
End: 2018-11-05

## 2018-11-05 VITALS
TEMPERATURE: 99.6 F | HEIGHT: 64 IN | WEIGHT: 113 LBS | OXYGEN SATURATION: 99 % | HEART RATE: 88 BPM | BODY MASS INDEX: 19.29 KG/M2 | SYSTOLIC BLOOD PRESSURE: 118 MMHG | DIASTOLIC BLOOD PRESSURE: 72 MMHG

## 2018-11-05 DIAGNOSIS — J06.9 ACUTE URI: Primary | ICD-10-CM

## 2018-11-05 DIAGNOSIS — J02.9 ACUTE PHARYNGITIS, UNSPECIFIED ETIOLOGY: ICD-10-CM

## 2018-11-05 PROCEDURE — 99213 OFFICE O/P EST LOW 20 MIN: CPT | Performed by: NURSE PRACTITIONER

## 2018-11-05 RX ORDER — PSEUDOEPHEDRINE HCL 120 MG/1
120 TABLET, FILM COATED, EXTENDED RELEASE ORAL EVERY 12 HOURS
Qty: 30 TABLET | Refills: 0 | Status: SHIPPED | OUTPATIENT
Start: 2018-11-05 | End: 2019-03-18

## 2018-11-05 RX ORDER — AZITHROMYCIN 250 MG/1
TABLET, FILM COATED ORAL
Qty: 6 TABLET | Refills: 0 | Status: SHIPPED | OUTPATIENT
Start: 2018-11-05 | End: 2019-03-18

## 2018-11-05 NOTE — PROGRESS NOTES
Subjective   Mary Ann Mondragon is a 33 y.o. female. Patient here today with complaints of Fever (Went to  on saturday ); Sore Throat; and Cough  pt here today with complaints of sore throat,m fever, hoarseness, started last Friday, was seen at urgent care and had neg strep and flu and both were negative, did have flu shot last Tuesday. Does have headache in evenings. Was given steroid inj which helped short term but now symptoms have returned. tmax 101    Vitals:    11/05/18 1024   BP: 118/72   Pulse: 88   Temp: 99.6 °F (37.6 °C)   SpO2: 99%     Past Medical History:   Diagnosis Date   • Abdominal pain, epigastric    • Acquired hypothyroidism    • Acute pharyngitis    • Carbuncle/boil     left axilla   • Contact dermatitis    • Contraception    • Cough    • Dysmenorrhea    • Dysuria    • Encounter for gynecological examination (general) (routine) without abnormal findings    • Epigastric pain    • Fatigue    • GERD (gastroesophageal reflux disease)    • Hashimoto's thyroiditis    • Heartburn    • Increased frequency of urination    • Knee pain    • Multiple joint pain    • Muscle pain    • Nausea    • Neoplasm of ovary    • Pain in wrist     Right wrist.   • Pelvic pain    • Potential Infectious disease contact    • Special examinations and investigations     Gynecological examination   • Surveillance of oral contraception done    • Syncope    • Upper respiratory infection    • Visit for gynecologic examination    • Vitamin D deficiency    • Vitamin deficiency      Sore Throat    This is a new problem. The current episode started in the past 7 days. The problem has been unchanged. Neither side of throat is experiencing more pain than the other. The maximum temperature recorded prior to her arrival was 100 - 100.9 F. The fever has been present for 1 to 2 days. The pain is at a severity of 4/10. Associated symptoms include congestion, coughing, ear pain, headaches, a hoarse voice, shortness of breath, swollen  glands and trouble swallowing. Pertinent negatives include no abdominal pain, diarrhea, drooling, ear discharge, plugged ear sensation, neck pain, stridor or vomiting. She has had no exposure to strep or mono. She has tried nothing for the symptoms. The treatment provided no relief.        The following portions of the patient's history were reviewed and updated as appropriate: allergies, current medications, past family history, past medical history, past social history, past surgical history and problem list.    Review of Systems   Constitutional: Negative.    HENT: Positive for congestion, ear pain, hoarse voice, sore throat and trouble swallowing. Negative for drooling and ear discharge.    Eyes: Negative.    Respiratory: Positive for cough and shortness of breath. Negative for stridor.    Cardiovascular: Negative.    Gastrointestinal: Negative.  Negative for abdominal pain, diarrhea and vomiting.   Endocrine: Negative.    Genitourinary: Negative.    Musculoskeletal: Negative.  Negative for neck pain.   Skin: Negative.    Allergic/Immunologic: Negative.    Neurological: Positive for headaches.   Hematological: Negative.    Psychiatric/Behavioral: Negative.        Objective   Physical Exam   Constitutional: She is oriented to person, place, and time. She appears well-developed and well-nourished. No distress.   HENT:   Head: Normocephalic and atraumatic.   Right Ear: Tympanic membrane is bulging.   Left Ear: Tympanic membrane is bulging.   Nose: Mucosal edema and rhinorrhea present. Right sinus exhibits no maxillary sinus tenderness and no frontal sinus tenderness. Left sinus exhibits no maxillary sinus tenderness and no frontal sinus tenderness.   Mouth/Throat: Uvula is midline and mucous membranes are normal. Posterior oropharyngeal erythema present. No tonsillar exudate.   Neck: Neck supple.   Cardiovascular: Normal rate, regular rhythm and normal heart sounds.  Exam reveals no gallop and no friction rub.     No murmur heard.  Pulmonary/Chest: Effort normal and breath sounds normal. No respiratory distress. She has no wheezes. She has no rales.   Lymphadenopathy:     She has cervical adenopathy.   Neurological: She is alert and oriented to person, place, and time.   Skin: Skin is warm and dry. No rash noted. She is not diaphoretic. No erythema. No pallor.   Psychiatric: She has a normal mood and affect. Her behavior is normal.   Nursing note and vitals reviewed.      Assessment/Plan   Mary Ann was seen today for fever, sore throat and cough.    Diagnoses and all orders for this visit:    Acute URI    Acute pharyngitis, unspecified etiology    Other orders  -     azithromycin (ZITHROMAX Z-HEAVEN) 250 MG tablet; Take 2 tablets the first day, then 1 tablet daily for 4 days.  -     pseudoephedrine (SUDAFED 12 HOUR) 120 MG 12 hr tablet; Take 1 tablet by mouth Every 12 (Twelve) Hours.      Advised to push fluids, gargle with warm salt water, is given sudafed and zithromax pk as above  If her symptoms persist/worsen she is to RTC for recheck  Work excuse given to her for today and tomorrow  She is aware and is in agreement to this mo  All questions and concerns are addressed with understanding noted.

## 2019-03-18 ENCOUNTER — OFFICE VISIT (OUTPATIENT)
Dept: FAMILY MEDICINE CLINIC | Facility: CLINIC | Age: 34
End: 2019-03-18

## 2019-03-18 VITALS
WEIGHT: 116.2 LBS | HEIGHT: 64 IN | HEART RATE: 81 BPM | BODY MASS INDEX: 19.84 KG/M2 | DIASTOLIC BLOOD PRESSURE: 70 MMHG | SYSTOLIC BLOOD PRESSURE: 120 MMHG

## 2019-03-18 DIAGNOSIS — Z20.2 EXPOSURE TO GENITAL HERPES: Primary | ICD-10-CM

## 2019-03-18 DIAGNOSIS — Z70.9 SEX COUNSELING, UNSPECIFIED: ICD-10-CM

## 2019-03-18 PROCEDURE — 99213 OFFICE O/P EST LOW 20 MIN: CPT | Performed by: NURSE PRACTITIONER

## 2019-03-19 NOTE — PROGRESS NOTES
"Subjective   Mary Ann Mondragon is a 34 y.o. female. Patient here today with complaints of Personal Issues  Patient here today to discuss \"personal issues \", states she is in a relationship with a man who has hx of genital herpes and she has questions and concerns to discuss today. Wants to discuss best options for her best gyn health. Pt without symptoms    Vitals:    03/18/19 1322   BP: 120/70   Pulse: 81     Past Medical History:   Diagnosis Date   • Abdominal pain, epigastric    • Acquired hypothyroidism    • Acute pharyngitis    • Carbuncle/boil     left axilla   • Contact dermatitis    • Contraception    • Cough    • Dysmenorrhea    • Dysuria    • Encounter for gynecological examination (general) (routine) without abnormal findings    • Epigastric pain    • Fatigue    • GERD (gastroesophageal reflux disease)    • Hashimoto's thyroiditis    • Heartburn    • Increased frequency of urination    • Knee pain    • Multiple joint pain    • Muscle pain    • Nausea    • Neoplasm of ovary    • Pain in wrist     Right wrist.   • Pelvic pain    • Potential Infectious disease contact    • Special examinations and investigations     Gynecological examination   • Surveillance of oral contraception done    • Syncope    • Upper respiratory infection    • Visit for gynecologic examination    • Vitamin D deficiency    • Vitamin deficiency      History of Present Illness     The following portions of the patient's history were reviewed and updated as appropriate: allergies, current medications, past family history, past medical history, past social history, past surgical history and problem list.    Review of Systems   Constitutional: Negative.    HENT: Negative.    Eyes: Negative.    Respiratory: Negative.    Cardiovascular: Negative.    Gastrointestinal: Negative.    Endocrine: Negative.    Genitourinary: Negative.    Musculoskeletal: Negative.    Skin: Negative.    Allergic/Immunologic: Negative.    Neurological: " Negative.    Hematological: Negative.    Psychiatric/Behavioral: Negative.        Objective   Physical Exam   Constitutional: She is oriented to person, place, and time. She appears well-developed and well-nourished. No distress.   Cardiovascular: Normal rate.   Pulmonary/Chest: Effort normal.   Neurological: She is alert and oriented to person, place, and time.   Skin: Skin is warm and dry. She is not diaphoretic.   Psychiatric: She has a normal mood and affect. Her behavior is normal. Judgment and thought content normal.   Nursing note and vitals reviewed.      Assessment/Plan   Mary Ann was seen today for personal issues.    Diagnoses and all orders for this visit:    Exposure to genital herpes    Sex counseling, unspecified    we had a long discussion regarding safe sex practices, written and verbal information given to her regarding herpes, type 2. She is advised to use condoms if she chooses to begin a sexual relationship with this new partner. She is informed of what signs and symptoms to watch for in her body.  She is aware and is in agreement to this plan. All questions and concerns are addressed with understanding noted. She will RTC as scheduled for chronic conditions or prn.

## 2019-05-13 RX ORDER — NORGESTIMATE AND ETHINYL ESTRADIOL 7DAYSX3 28
KIT ORAL
Qty: 84 TABLET | Refills: 4 | Status: SHIPPED | OUTPATIENT
Start: 2019-05-13 | End: 2019-06-26 | Stop reason: SDUPTHER

## 2019-05-16 RX ORDER — LEVOTHYROXINE AND LIOTHYRONINE 57; 13.5 UG/1; UG/1
45 TABLET ORAL DAILY
Qty: 15 TABLET | Refills: 1 | Status: SHIPPED | OUTPATIENT
Start: 2019-05-16 | End: 2019-06-03 | Stop reason: SDUPTHER

## 2019-06-03 RX ORDER — THYROID,PORK 90 MG
TABLET ORAL
Qty: 15 TABLET | Refills: 0 | Status: SHIPPED | OUTPATIENT
Start: 2019-06-03 | End: 2019-06-27 | Stop reason: SDUPTHER

## 2019-06-10 ENCOUNTER — OFFICE VISIT (OUTPATIENT)
Dept: FAMILY MEDICINE CLINIC | Facility: CLINIC | Age: 34
End: 2019-06-10

## 2019-06-10 VITALS
DIASTOLIC BLOOD PRESSURE: 68 MMHG | HEIGHT: 64 IN | BODY MASS INDEX: 19.81 KG/M2 | TEMPERATURE: 98.4 F | SYSTOLIC BLOOD PRESSURE: 110 MMHG | WEIGHT: 116 LBS | HEART RATE: 88 BPM

## 2019-06-10 DIAGNOSIS — Z20.2 EXPOSURE TO GENITAL HERPES: ICD-10-CM

## 2019-06-10 DIAGNOSIS — N76.0 ACUTE VAGINITIS: Primary | ICD-10-CM

## 2019-06-10 PROCEDURE — 99213 OFFICE O/P EST LOW 20 MIN: CPT | Performed by: NURSE PRACTITIONER

## 2019-06-10 RX ORDER — FLUCONAZOLE 200 MG/1
200 TABLET ORAL DAILY
Qty: 2 TABLET | Refills: 0 | Status: SHIPPED | OUTPATIENT
Start: 2019-06-10 | End: 2019-06-20

## 2019-06-20 ENCOUNTER — OFFICE VISIT (OUTPATIENT)
Dept: FAMILY MEDICINE CLINIC | Facility: CLINIC | Age: 34
End: 2019-06-20

## 2019-06-20 VITALS
BODY MASS INDEX: 19.5 KG/M2 | TEMPERATURE: 97 F | HEART RATE: 83 BPM | RESPIRATION RATE: 18 BRPM | HEIGHT: 64 IN | SYSTOLIC BLOOD PRESSURE: 118 MMHG | WEIGHT: 114.2 LBS | DIASTOLIC BLOOD PRESSURE: 68 MMHG | OXYGEN SATURATION: 99 %

## 2019-06-20 DIAGNOSIS — N89.8 VAGINAL ITCHING: Primary | ICD-10-CM

## 2019-06-20 DIAGNOSIS — Z20.828 EXPOSURE TO HERPES SIMPLEX VIRUS (HSV): ICD-10-CM

## 2019-06-20 DIAGNOSIS — B37.49 CANDIDA INFECTION OF GENITAL REGION: ICD-10-CM

## 2019-06-20 LAB
BACTERIA UR QL AUTO: ABNORMAL /HPF
BILIRUB UR QL STRIP: NEGATIVE
CANDIDA ALBICANS: NEGATIVE
CLARITY UR: CLEAR
COLOR UR: YELLOW
GARDNERELLA VAGINALIS: POSITIVE
GLUCOSE UR STRIP-MCNC: NEGATIVE MG/DL
HGB UR QL STRIP.AUTO: NEGATIVE
HYALINE CASTS UR QL AUTO: ABNORMAL /LPF
KETONES UR QL STRIP: NEGATIVE
LEUKOCYTE ESTERASE UR QL STRIP.AUTO: ABNORMAL
NITRITE UR QL STRIP: NEGATIVE
PH UR STRIP.AUTO: 7 [PH] (ref 5.5–8)
PROT UR QL STRIP: ABNORMAL
RBC # UR: ABNORMAL /HPF
REF LAB TEST METHOD: ABNORMAL
SP GR UR STRIP: 1.01 (ref 1–1.03)
SQUAMOUS #/AREA URNS HPF: ABNORMAL /HPF
T VAGINALIS DNA VAG QL PROBE+SIG AMP: NEGATIVE
UROBILINOGEN UR QL STRIP: ABNORMAL
WBC UR QL AUTO: ABNORMAL /HPF

## 2019-06-20 PROCEDURE — 87510 GARDNER VAG DNA DIR PROBE: CPT | Performed by: PHYSICIAN ASSISTANT

## 2019-06-20 PROCEDURE — 99213 OFFICE O/P EST LOW 20 MIN: CPT | Performed by: PHYSICIAN ASSISTANT

## 2019-06-20 PROCEDURE — 87480 CANDIDA DNA DIR PROBE: CPT | Performed by: PHYSICIAN ASSISTANT

## 2019-06-20 PROCEDURE — 81001 URINALYSIS AUTO W/SCOPE: CPT | Performed by: PHYSICIAN ASSISTANT

## 2019-06-20 PROCEDURE — 87660 TRICHOMONAS VAGIN DIR PROBE: CPT | Performed by: PHYSICIAN ASSISTANT

## 2019-06-20 RX ORDER — NYSTATIN 100000 U/G
CREAM TOPICAL 2 TIMES DAILY
Qty: 30 G | Refills: 0 | Status: SHIPPED | OUTPATIENT
Start: 2019-06-20 | End: 2019-06-26

## 2019-06-20 NOTE — PROGRESS NOTES
Subjective   Mary Ann Mondragon is a 34 y.o. female.   Pt is new to me.   Vaginitis   This is a new problem. The current episode started in the past 7 days. The problem occurs constantly. The problem has been unchanged. Associated symptoms include a rash (bilateral labia, erythematous). Pertinent negatives include no abdominal pain, anorexia, arthralgias, change in bowel habit, chest pain, chills, congestion, coughing, diaphoresis, fatigue, fever, headaches, joint swelling, myalgias, nausea, neck pain, numbness, sore throat, swollen glands, urinary symptoms, vertigo, visual change, vomiting or weakness. Exacerbated by: urination.   Female  Problem   The patient's primary symptoms include genital itching, genital lesions, a genital odor, a genital rash and vaginal discharge (serous ). The patient's pertinent negatives include no missed menses, pelvic pain or vaginal bleeding. This is a recurrent problem. The current episode started yesterday. The problem occurs constantly. The problem has been unchanged. The pain is mild. The problem affects the right side. She is not pregnant. Associated symptoms include rash (bilateral labia, erythematous). Pertinent negatives include no abdominal pain, anorexia, back pain, chills, constipation, diarrhea, discolored urine, dysuria, fever, flank pain, frequency, headaches, hematuria, joint pain, joint swelling, nausea, painful intercourse, sore throat, urgency or vomiting. The vaginal discharge was scant and thin. She has not been passing clots. She has not been passing tissue. Nothing aggravates the symptoms. She is sexually active. Yes, her partner has an STD. She uses condoms and oral contraceptives for contraception. Her menstrual history has been regular.      Pt presents today with vaginal irritation and vaginal itching x 2 days. Admits to serous vaginal discharge x 3 weeks, genital odor (fishy). Admits to erythema to bilateral labia, worse on right. Denies dysuria.  LMP x 1.5 weeks ago, 5 days, regular. Pt reports she was treated for vaginal candidiasis x 1 week ago.     The following portions of the patient's history were reviewed and updated as appropriate: allergies, current medications, past family history, past medical history, past social history, past surgical history and problem list.    Review of Systems   Constitutional: Negative for activity change, appetite change, chills, diaphoresis, fatigue, fever, unexpected weight gain and unexpected weight loss.   HENT: Negative for congestion and sore throat.    Eyes: Negative for blurred vision, double vision, pain, redness and visual disturbance.   Respiratory: Negative for apnea, cough, choking, chest tightness, shortness of breath, wheezing and stridor.    Cardiovascular: Negative for chest pain, palpitations and leg swelling.   Gastrointestinal: Negative for abdominal distention, abdominal pain, anorexia, change in bowel habit, constipation, diarrhea, nausea, vomiting, GERD and indigestion.   Genitourinary: Positive for vaginal discharge (serous ). Negative for decreased urine volume, difficulty urinating, dysuria, flank pain, frequency, hematuria, menstrual problem, missed menses, pelvic pain, urgency, vaginal bleeding and vaginal pain.   Musculoskeletal: Negative for arthralgias, back pain, gait problem, joint pain, joint swelling, myalgias, neck pain, neck stiffness and bursitis.   Skin: Positive for color change (erythema bilateral labia) and rash (bilateral labia, erythematous). Negative for dry skin, pallor, skin lesions and bruise.   Neurological: Negative for vertigo, weakness and numbness.   Psychiatric/Behavioral: Negative.        Objective   Physical Exam   Constitutional: She is oriented to person, place, and time. Vital signs are normal. She appears well-developed and well-nourished. She is active and cooperative.  Non-toxic appearance. She does not have a sickly appearance. She does not appear ill. No  distress.   HENT:   Head: Normocephalic.   Right Ear: External ear normal.   Left Ear: External ear normal.   Nose: Nose normal.   Eyes: Conjunctivae and EOM are normal. Pupils are equal, round, and reactive to light.   Neck: Normal range of motion. Neck supple.   Cardiovascular: Normal rate, regular rhythm and normal heart sounds. Exam reveals no gallop and no friction rub.   No murmur heard.  Pulmonary/Chest: Effort normal and breath sounds normal. No stridor. No respiratory distress. She has no wheezes. She has no rales. She exhibits no tenderness.   Abdominal: Soft. Bowel sounds are normal. She exhibits no distension and no mass. There is no tenderness. There is no rebound and no guarding. No hernia.   Genitourinary:       No labial fusion. There is rash on the right labia. There is no tenderness, lesion, injury or Bartholin's cyst on the right labia. There is rash on the left labia. There is no tenderness, lesion, injury or Bartholin's cyst on the left labia.   Musculoskeletal: Normal range of motion. She exhibits no edema.   Neurological: She is alert and oriented to person, place, and time.   Skin: Skin is warm and dry. Capillary refill takes less than 2 seconds. Rash (labial erythematous) noted. She is not diaphoretic. There is erythema. No pallor.   Psychiatric: She has a normal mood and affect. Her behavior is normal. Judgment and thought content normal.   Nursing note and vitals reviewed.    Vitals:    06/20/19 0916   BP: 118/68   Pulse: 83   Resp: 18   Temp: 97 °F (36.1 °C)   SpO2: 99%    PHQ-2 Depression Screening  Little interest or pleasure in doing things? 0   Feeling down, depressed, or hopeless? 0   PHQ-2 Total Score 0        Assessment/Plan   Mary Ann was seen today for vaginitis.    Diagnoses and all orders for this visit:    Vaginal itching  -     Urinalysis With Culture If Indicated - Urine, Clean Catch; Future  -     Gardnerella vaginalis, Trichomonas vaginalis, Candida albicans, DNA - Swab,  Vagina; Future  -     Gardnerella vaginalis, Trichomonas vaginalis, Candida albicans, DNA - Swab, Vagina  -     Urinalysis With Culture If Indicated - Urine, Clean Catch  -     Urinalysis, Microscopic Only - Urine, Clean Catch; Future  -     Urinalysis, Microscopic Only - Urine, Clean Catch    Exposure to herpes simplex virus (HSV)  -     HSV 1 and 2 IgM, Abs, Indirect; Future  -     HSV 1 and 2-Specific Ab, IgG; Future    Candida infection of genital region  -     nystatin (MYCOSTATIN) 178185 UNIT/GM cream; Apply  topically to the appropriate area as directed 2 (Two) Times a Day.      Exposure to HSV - as per pt's request HSV IgM and IgG testing as above. Discussed safe sex practices regarding HSV. Discussed importance of using condoms with any sexual activity and s/s of HSV. Pt verbalized understanding.     Candidal infection - prescription for nystatin cream as above sent to pharmacy. Advised pt to RTC if sx do not improve, sx worsen, rash spreads, fever/chills, warmth and discussed additional symptoms.    Vaginal itching - urinalysis, as above, ordered for evaluation - discussed results in office with pt - grossly wnl. Nystatin cream as above sent to pharmacy for candidal skin infection. Gardnerella, trichomonas, & candidal swab as above ordered for evaluation - results pending - will call pt with results when received & address appropriately.     Patient educated to follow up sooner than next scheduled appointment if symptoms worsen or do not improve. Patient stated understanding and has agreed with plan of care. After visit summary was printed and given to patient.      This document has been electronically signed by Tamara Warren PA-C on June 20, 2019 11:01 PM,.

## 2019-06-21 ENCOUNTER — TELEPHONE (OUTPATIENT)
Dept: FAMILY MEDICINE CLINIC | Facility: CLINIC | Age: 34
End: 2019-06-21

## 2019-06-21 DIAGNOSIS — B96.89 BACTERIAL VAGINOSIS: Primary | ICD-10-CM

## 2019-06-21 DIAGNOSIS — N76.0 BACTERIAL VAGINOSIS: Primary | ICD-10-CM

## 2019-06-21 RX ORDER — METRONIDAZOLE 500 MG/1
500 TABLET ORAL 2 TIMES DAILY
Qty: 14 TABLET | Refills: 0 | Status: SHIPPED | OUTPATIENT
Start: 2019-06-21 | End: 2019-06-26

## 2019-06-25 ENCOUNTER — APPOINTMENT (OUTPATIENT)
Dept: LAB | Facility: HOSPITAL | Age: 34
End: 2019-06-25

## 2019-06-25 ENCOUNTER — TELEPHONE (OUTPATIENT)
Dept: FAMILY MEDICINE CLINIC | Facility: CLINIC | Age: 34
End: 2019-06-25

## 2019-06-25 DIAGNOSIS — E06.3 HYPOTHYROIDISM DUE TO HASHIMOTO'S THYROIDITIS: ICD-10-CM

## 2019-06-25 DIAGNOSIS — E55.9 VITAMIN D DEFICIENCY: Primary | ICD-10-CM

## 2019-06-25 DIAGNOSIS — E03.8 HYPOTHYROIDISM DUE TO HASHIMOTO'S THYROIDITIS: ICD-10-CM

## 2019-06-26 ENCOUNTER — LAB (OUTPATIENT)
Dept: LAB | Facility: HOSPITAL | Age: 34
End: 2019-06-26

## 2019-06-26 ENCOUNTER — OFFICE VISIT (OUTPATIENT)
Dept: FAMILY MEDICINE CLINIC | Facility: CLINIC | Age: 34
End: 2019-06-26

## 2019-06-26 VITALS
HEART RATE: 73 BPM | WEIGHT: 114.4 LBS | DIASTOLIC BLOOD PRESSURE: 62 MMHG | BODY MASS INDEX: 19.53 KG/M2 | SYSTOLIC BLOOD PRESSURE: 110 MMHG | HEIGHT: 64 IN | TEMPERATURE: 98.4 F

## 2019-06-26 DIAGNOSIS — Z12.4 PAP SMEAR FOR CERVICAL CANCER SCREENING: ICD-10-CM

## 2019-06-26 DIAGNOSIS — N89.8 VAGINAL DISCHARGE: ICD-10-CM

## 2019-06-26 DIAGNOSIS — Z20.828 EXPOSURE TO HERPES SIMPLEX VIRUS (HSV): ICD-10-CM

## 2019-06-26 DIAGNOSIS — Z01.419 WELL WOMAN EXAM WITH ROUTINE GYNECOLOGICAL EXAM: Primary | ICD-10-CM

## 2019-06-26 DIAGNOSIS — Z30.41 ENCOUNTER FOR SURVEILLANCE OF CONTRACEPTIVE PILLS: ICD-10-CM

## 2019-06-26 LAB
25(OH)D3 SERPL-MCNC: 34 NG/ML (ref 30–100)
ALBUMIN SERPL-MCNC: 4.2 G/DL (ref 3.5–5.2)
ALBUMIN/GLOB SERPL: 1.4 G/DL
ALP SERPL-CCNC: 39 U/L (ref 39–117)
ALT SERPL W P-5'-P-CCNC: 11 U/L (ref 1–33)
ANION GAP SERPL CALCULATED.3IONS-SCNC: 10.3 MMOL/L (ref 5–15)
AST SERPL-CCNC: 18 U/L (ref 1–32)
BASOPHILS # BLD AUTO: 0.02 10*3/MM3 (ref 0–0.2)
BASOPHILS NFR BLD AUTO: 0.4 % (ref 0–1.5)
BILIRUB SERPL-MCNC: 0.4 MG/DL (ref 0.2–1.2)
BUN BLD-MCNC: 15 MG/DL (ref 6–20)
BUN/CREAT SERPL: 24.6 (ref 7–25)
CALCIUM SPEC-SCNC: 9.1 MG/DL (ref 8.6–10.5)
CHLORIDE SERPL-SCNC: 105 MMOL/L (ref 98–107)
CO2 SERPL-SCNC: 23.7 MMOL/L (ref 22–29)
CREAT BLD-MCNC: 0.61 MG/DL (ref 0.57–1)
DEPRECATED RDW RBC AUTO: 40.5 FL (ref 37–54)
EOSINOPHIL # BLD AUTO: 0.16 10*3/MM3 (ref 0–0.4)
EOSINOPHIL NFR BLD AUTO: 2.9 % (ref 0.3–6.2)
ERYTHROCYTE [DISTWIDTH] IN BLOOD BY AUTOMATED COUNT: 12.4 % (ref 12.3–15.4)
GFR SERPL CREATININE-BSD FRML MDRD: 111 ML/MIN/1.73
GLOBULIN UR ELPH-MCNC: 2.9 GM/DL
GLUCOSE BLD-MCNC: 82 MG/DL (ref 65–99)
HCT VFR BLD AUTO: 36.5 % (ref 34–46.6)
HGB BLD-MCNC: 12.3 G/DL (ref 12–15.9)
IMM GRANULOCYTES # BLD AUTO: 0.01 10*3/MM3 (ref 0–0.05)
IMM GRANULOCYTES NFR BLD AUTO: 0.2 % (ref 0–0.5)
LYMPHOCYTES # BLD AUTO: 2 10*3/MM3 (ref 0.7–3.1)
LYMPHOCYTES NFR BLD AUTO: 36.5 % (ref 19.6–45.3)
MCH RBC QN AUTO: 30.1 PG (ref 26.6–33)
MCHC RBC AUTO-ENTMCNC: 33.7 G/DL (ref 31.5–35.7)
MCV RBC AUTO: 89.5 FL (ref 79–97)
MONOCYTES # BLD AUTO: 0.45 10*3/MM3 (ref 0.1–0.9)
MONOCYTES NFR BLD AUTO: 8.2 % (ref 5–12)
NEUTROPHILS # BLD AUTO: 2.85 10*3/MM3 (ref 1.7–7)
NEUTROPHILS NFR BLD AUTO: 52 % (ref 42.7–76)
PLATELET # BLD AUTO: 143 10*3/MM3 (ref 140–450)
PMV BLD AUTO: 13.2 FL (ref 6–12)
POTASSIUM BLD-SCNC: 4.2 MMOL/L (ref 3.5–5.2)
PROT SERPL-MCNC: 7.1 G/DL (ref 6–8.5)
RBC # BLD AUTO: 4.08 10*6/MM3 (ref 3.77–5.28)
SODIUM BLD-SCNC: 139 MMOL/L (ref 136–145)
TSH SERPL DL<=0.05 MIU/L-ACNC: 1.82 MIU/ML (ref 0.27–4.2)
WBC NRBC COR # BLD: 5.48 10*3/MM3 (ref 3.4–10.8)

## 2019-06-26 PROCEDURE — 86695 HERPES SIMPLEX TYPE 1 TEST: CPT

## 2019-06-26 PROCEDURE — 80053 COMPREHEN METABOLIC PANEL: CPT | Performed by: NURSE PRACTITIONER

## 2019-06-26 PROCEDURE — 88142 CYTOPATH C/V THIN LAYER: CPT | Performed by: NURSE PRACTITIONER

## 2019-06-26 PROCEDURE — 36415 COLL VENOUS BLD VENIPUNCTURE: CPT | Performed by: NURSE PRACTITIONER

## 2019-06-26 PROCEDURE — 99395 PREV VISIT EST AGE 18-39: CPT | Performed by: NURSE PRACTITIONER

## 2019-06-26 PROCEDURE — 82306 VITAMIN D 25 HYDROXY: CPT | Performed by: NURSE PRACTITIONER

## 2019-06-26 PROCEDURE — 86696 HERPES SIMPLEX TYPE 2 TEST: CPT

## 2019-06-26 PROCEDURE — 88141 CYTOPATH C/V INTERPRET: CPT | Performed by: PATHOLOGY

## 2019-06-26 PROCEDURE — 85025 COMPLETE CBC W/AUTO DIFF WBC: CPT | Performed by: NURSE PRACTITIONER

## 2019-06-26 PROCEDURE — 84443 ASSAY THYROID STIM HORMONE: CPT | Performed by: NURSE PRACTITIONER

## 2019-06-26 RX ORDER — NORGESTIMATE AND ETHINYL ESTRADIOL 7DAYSX3 28
1 KIT ORAL DAILY
Qty: 84 TABLET | Refills: 4 | Status: SHIPPED | OUTPATIENT
Start: 2019-06-26 | End: 2020-09-01

## 2019-06-26 NOTE — PROGRESS NOTES
Subjective   Mary Ann Mondragon is a 34 y.o. female. Patient here today with complaints of Gynecologic Exam  Patient here today for well woman examination, due for Pap and needing refills on OCPs today.  Reports having regular menses and no side effects of medication.  Was recently treated with nystatin cream as well as Diflucan and Flagyl, did not take Flagyl p.o. due to potential interactions with alcohol however her symptoms have improved dramatically on nystatin and Diflucan alone.  Is sexually active, reports partner with genital herpes and states she does use condoms as well.  Reports family history of ovarian cancer in mother who was diagnosed at 55 and  at 59.  Denies tobacco use, denies recreational drug use.  Reports alcohol use of approximately 1-2 drinks per week.    Vitals:    19 1027   BP: 110/62   Pulse: 73   Temp: 98.4 °F (36.9 °C)     Past Medical History:   Diagnosis Date   • Abdominal pain, epigastric    • Acquired hypothyroidism    • Acute pharyngitis    • Carbuncle/boil     left axilla   • Contact dermatitis    • Contraception    • Cough    • Dysmenorrhea    • Dysuria    • Encounter for gynecological examination (general) (routine) without abnormal findings    • Epigastric pain    • Fatigue    • GERD (gastroesophageal reflux disease)    • Hashimoto's thyroiditis    • Heartburn    • Increased frequency of urination    • Knee pain    • Multiple joint pain    • Muscle pain    • Nausea    • Neoplasm of ovary    • Pain in wrist     Right wrist.   • Pelvic pain    • Potential Infectious disease contact    • Special examinations and investigations     Gynecological examination   • Surveillance of oral contraception done    • Syncope    • Upper respiratory infection    • Visit for gynecologic examination    • Vitamin D deficiency    • Vitamin deficiency      Family History   Problem Relation Age of Onset   • Ovarian cancer Other    • Stomach cancer Other    • CHRISTIANA disease Other       Past Surgical History:   Procedure Laterality Date   • ESOPHAGOSCOPY / EGD  2015    Normal esophagus. Gastritis found in the stomach. Biopsy taken. Normal duodenum.   • TONSILLECTOMY AND ADENOIDECTOMY     • TYMPANOSTOMY TUBE PLACEMENT       Social History     Socioeconomic History   • Marital status: Single     Spouse name: Not on file   • Number of children: Not on file   • Years of education: Not on file   • Highest education level: Not on file   Tobacco Use   • Smoking status: Never Smoker   • Smokeless tobacco: Never Used   Substance and Sexual Activity   • Alcohol use: Yes     Comment: Occasionally     Sexual History:       OB History   See below     Menstrual History:  OB History     [id number]         Menarche age: 10  LMP 17         Gynecologic Exam   The patient's primary symptoms include vaginal discharge. The patient's pertinent negatives include no genital itching, genital lesions, genital odor, genital rash, missed menses, pelvic pain or vaginal bleeding. This is a new problem. The problem occurs intermittently. The patient is experiencing no pain. She is not pregnant. The vaginal discharge was yellow. Nothing aggravates the symptoms. She is sexually active. Partner with STD symptoms: partner with genital herpes  She uses oral contraceptives for contraception. Her menstrual history has been regular. Her past medical history is significant for vaginosis. There is no history of a  section, an ectopic pregnancy, herpes simplex, menorrhagia, metrorrhagia, miscarriage, perineal abscess, PID, an STD or a terminated pregnancy.        The following portions of the patient's history were reviewed and updated as appropriate: allergies, current medications, past family history, past medical history, past social history, past surgical history and problem list.    Review of Systems   Constitutional: Negative.    HENT: Negative.    Eyes: Negative.    Respiratory: Negative.    Cardiovascular:  Negative.    Gastrointestinal: Negative.    Endocrine: Negative.    Genitourinary: Positive for vaginal discharge. Negative for menorrhagia, missed menses and pelvic pain.   Musculoskeletal: Negative.    Skin: Negative.    Allergic/Immunologic: Negative.    Neurological: Negative.    Hematological: Negative.    Psychiatric/Behavioral: Negative.        Objective   Physical Exam   Constitutional: She is oriented to person, place, and time. She appears well-developed and well-nourished. No distress.   HENT:   Head: Normocephalic and atraumatic.   Eyes: EOM are normal.   Neck: Normal range of motion. Neck supple. Normal carotid pulses present. Carotid bruit is not present.   Cardiovascular: Normal rate, regular rhythm and normal heart sounds. Exam reveals no gallop and no friction rub.   No murmur heard.  Pulmonary/Chest: Effort normal and breath sounds normal. No stridor. No respiratory distress. She has no wheezes. She has no rales. She exhibits no tenderness. Right breast exhibits no inverted nipple, no mass, no nipple discharge, no skin change and no tenderness. Left breast exhibits no inverted nipple, no mass, no nipple discharge, no skin change and no tenderness. Breasts are symmetrical. There is no breast swelling.   Abdominal: Soft. Bowel sounds are normal. She exhibits no distension and no mass. There is no tenderness. There is no rebound and no guarding. No hernia. Hernia confirmed negative in the right inguinal area and confirmed negative in the left inguinal area.   Genitourinary: Rectum normal and uterus normal. No breast tenderness, discharge or bleeding. Pelvic exam was performed with patient supine. There is no rash, tenderness, lesion or injury on the right labia. There is no rash, tenderness, lesion or injury on the left labia. Uterus is not deviated, not enlarged, not fixed and not tender. Cervix exhibits no motion tenderness, no discharge and no friability. Right adnexum displays no mass, no  tenderness and no fullness. Left adnexum displays no mass, no tenderness and no fullness. No erythema, tenderness or bleeding in the vagina. No foreign body in the vagina. No signs of injury around the vagina. Vaginal discharge found.   Genitourinary Comments: Mod amt of yellowish, thick discharge noted on exam    Musculoskeletal: Normal range of motion. She exhibits no edema, tenderness or deformity.   Lymphadenopathy:        Right: No inguinal adenopathy present.        Left: No inguinal adenopathy present.   Neurological: She is alert and oriented to person, place, and time. She has normal reflexes. She displays normal reflexes. Coordination normal.   Skin: Skin is warm and dry. No rash noted. She is not diaphoretic. No erythema. No pallor.   Psychiatric: She has a normal mood and affect. Her behavior is normal. Judgment and thought content normal.   Nursing note and vitals reviewed.      Assessment/Plan   Mary Ann was seen today for gynecologic exam.    Diagnoses and all orders for this visit:    Well woman exam with routine gynecological exam    Pap smear for cervical cancer screening  -     Liquid-based Pap Smear, Screening    Encounter for surveillance of contraceptive pills    Vaginal discharge  Comments:  likely BV but pt wants to wait on results of pap prior to being treated with metrogel     Other orders  -     norgestimate-ethinyl estradiol (TRI-SPRINTEC) 0.18/0.215/0.25 MG-35 MCG per tablet; Take 1 tablet by mouth Daily.             Lab Results (most recent)     None        Lab Results (most recent)     None          Pap obtained and sent to lab for cytology.  She will be informed of results via phone and treated with MetroGel for BV if indicated, offered treatment due to symptomatology today however she wants to wait until results of Pap are obtained and if indicated on Pap will receive treatment at that time.  OCPs will be revealed x1 year as above, she will follow-up here in 1 year for recheck or  sooner as needed for other chronic conditions or acute care.  Patient aware and in agreement to this plan.  All questions and concerns are addressed with understanding noted. She does report anxiety with flying, has trip where she will have to fly in early December, recommended her to come back close to that time if she is needing treatment with antianxiety medicine. Pt aware.

## 2019-06-27 ENCOUNTER — OFFICE VISIT (OUTPATIENT)
Dept: ENDOCRINOLOGY | Facility: CLINIC | Age: 34
End: 2019-06-27

## 2019-06-27 VITALS
WEIGHT: 112 LBS | SYSTOLIC BLOOD PRESSURE: 124 MMHG | BODY MASS INDEX: 19.12 KG/M2 | HEART RATE: 74 BPM | HEIGHT: 64 IN | DIASTOLIC BLOOD PRESSURE: 70 MMHG

## 2019-06-27 DIAGNOSIS — E03.8 HYPOTHYROIDISM DUE TO HASHIMOTO'S THYROIDITIS: Primary | ICD-10-CM

## 2019-06-27 DIAGNOSIS — E55.9 VITAMIN D DEFICIENCY: ICD-10-CM

## 2019-06-27 DIAGNOSIS — E06.3 HYPOTHYROIDISM DUE TO HASHIMOTO'S THYROIDITIS: Primary | ICD-10-CM

## 2019-06-27 LAB
HSV1 IGG SER IA-ACNC: <0.91 INDEX (ref 0–0.9)
HSV2 IGG SER IA-ACNC: <0.91 INDEX (ref 0–0.9)

## 2019-06-27 PROCEDURE — 99214 OFFICE O/P EST MOD 30 MIN: CPT | Performed by: NURSE PRACTITIONER

## 2019-06-27 RX ORDER — LEVOTHYROXINE AND LIOTHYRONINE 57; 13.5 UG/1; UG/1
45 TABLET ORAL DAILY
Qty: 90 TABLET | Refills: 3 | Status: SHIPPED | OUTPATIENT
Start: 2019-06-27 | End: 2020-06-12 | Stop reason: SDUPTHER

## 2019-06-27 NOTE — PROGRESS NOTES
Subjective    Mary Ann Mondragon is a 34 y.o. female. she is here today for follow-up.    History of Present Illness       Primary Care Provider     Kaylee Osullivan, [facility]     35 yo comes for fu    Hypothyroidism,     Constant,   controlled    , mild severity       Lab Results   Component Value Date    TSH 1.820 06/26/2019           She is compliant      Bone health       Taking vitamin d          Evaluation history:  TSH   Date Value Ref Range Status   06/26/2019 1.820 0.270 - 4.200 mIU/mL Final     Free T4   Date Value Ref Range Status   05/18/2018 0.64 (L) 0.78 - 2.19 ng/dL Final     T3, Free   Date Value Ref Range Status   05/18/2018 3.4 2.0 - 4.4 pg/mL Final       Current medications:  Current Outpatient Medications   Medication Sig Dispense Refill   • norgestimate-ethinyl estradiol (TRI-SPRINTEC) 0.18/0.215/0.25 MG-35 MCG per tablet Take 1 tablet by mouth Daily. 84 tablet 4   • Thyroid (ARMOUR THYROID) 90 MG PO tablet Take 0.5 tablets by mouth Daily. 90 tablet 3     No current facility-administered medications for this visit.        The following portions of the patient's history were reviewed and updated as appropriate:   Past Medical History:   Diagnosis Date   • Abdominal pain, epigastric    • Acquired hypothyroidism    • Acute pharyngitis    • Carbuncle/boil     left axilla   • Contact dermatitis    • Contraception    • Cough    • Dysmenorrhea    • Dysuria    • Encounter for gynecological examination (general) (routine) without abnormal findings    • Epigastric pain    • Fatigue    • GERD (gastroesophageal reflux disease)    • Hashimoto's thyroiditis    • Heartburn    • Increased frequency of urination    • Knee pain    • Multiple joint pain    • Muscle pain    • Nausea    • Neoplasm of ovary    • Pain in wrist     Right wrist.   • Pelvic pain    • Potential Infectious disease contact    • Special examinations and investigations     Gynecological examination   • Surveillance of oral contraception  done    • Syncope    • Upper respiratory infection    • Visit for gynecologic examination    • Vitamin D deficiency    • Vitamin deficiency      Past Surgical History:   Procedure Laterality Date   • ESOPHAGOSCOPY / EGD  06/08/2015    Normal esophagus. Gastritis found in the stomach. Biopsy taken. Normal duodenum.   • TONSILLECTOMY AND ADENOIDECTOMY     • TYMPANOSTOMY TUBE PLACEMENT       Family History   Problem Relation Age of Onset   • Ovarian cancer Other    • Stomach cancer Other    • CHRISTIANA disease Other      OB History     No data available        Allergies   Allergen Reactions   • Omeprazole      (Prilosec)     Social History     Socioeconomic History   • Marital status: Single     Spouse name: Not on file   • Number of children: Not on file   • Years of education: Not on file   • Highest education level: Not on file   Tobacco Use   • Smoking status: Never Smoker   • Smokeless tobacco: Never Used   Substance and Sexual Activity   • Alcohol use: Yes     Comment: Occasionally       Review of Systems  Review of Systems   Constitutional: Negative for activity change, appetite change, diaphoresis and fatigue.   HENT: Negative for facial swelling, sneezing, sore throat, tinnitus, trouble swallowing and voice change.    Eyes: Negative for photophobia, pain, discharge, redness, itching and visual disturbance.   Respiratory: Negative for apnea, cough, choking, chest tightness and shortness of breath.    Cardiovascular: Negative for chest pain, palpitations and leg swelling.   Gastrointestinal: Negative for abdominal distention, abdominal pain, constipation, diarrhea, nausea and vomiting.   Endocrine: Negative for cold intolerance, heat intolerance, polydipsia, polyphagia and polyuria.   Genitourinary: Negative for difficulty urinating, dysuria, frequency, hematuria and urgency.   Musculoskeletal: Negative for arthralgias, back pain, gait problem, joint swelling, myalgias, neck pain and neck stiffness.   Skin: Negative  "for color change, pallor, rash and wound.   Neurological: Negative for dizziness, tremors, weakness, light-headedness, numbness and headaches.   Hematological: Negative for adenopathy. Does not bruise/bleed easily.   Psychiatric/Behavioral: Negative for behavioral problems, confusion and sleep disturbance.        Objective    /70 (BP Location: Right arm, Patient Position: Sitting, Cuff Size: Adult)   Pulse 74   Ht 162.6 cm (64\")   Wt 50.8 kg (112 lb)   BMI 19.22 kg/m²   Physical Exam   Constitutional: She is oriented to person, place, and time. She appears well-developed and well-nourished. No distress.   HENT:   Head: Normocephalic and atraumatic.   Right Ear: External ear normal.   Left Ear: External ear normal.   Nose: Nose normal.   Eyes: Conjunctivae and EOM are normal. Pupils are equal, round, and reactive to light.   Neck: Normal range of motion. Neck supple. No tracheal deviation present. No thyromegaly present.   Cardiovascular: Normal rate, regular rhythm and normal heart sounds.   No murmur heard.  Pulmonary/Chest: Effort normal and breath sounds normal. No respiratory distress. She has no wheezes.   Abdominal: Soft. Bowel sounds are normal. There is no tenderness. There is no rebound and no guarding.   Musculoskeletal: Normal range of motion. She exhibits no edema, tenderness or deformity.   Neurological: She is alert and oriented to person, place, and time. No cranial nerve deficit.   Skin: Skin is warm and dry. No rash noted.   Psychiatric: She has a normal mood and affect. Her behavior is normal. Judgment and thought content normal.       Lab Review  Lab Results   Component Value Date    TSH 1.820 06/26/2019     Lab Results   Component Value Date    FREET4 0.64 (L) 05/18/2018        Assessment/Plan      1. Hypothyroidism due to Hashimoto's thyroiditis    2. Vitamin D deficiency    . This diagnosis was discussed and reviewed with the patient including the advantages of drug therapy.     1. " Orders placed during this encounter include:  Orders Placed This Encounter   Procedures   • Comprehensive Metabolic Panel     Standing Status:   Future     Standing Expiration Date:   6/27/2020   • TSH     Standing Status:   Future     Standing Expiration Date:   6/27/2020   • Vitamin D 25 Hydroxy     Standing Status:   Future     Standing Expiration Date:   6/27/2020   • Vitamin B12     Standing Status:   Future     Standing Expiration Date:   6/27/2020   • CBC & Differential     Standing Status:   Future     Standing Expiration Date:   6/27/2020     Order Specific Question:   Manual Differential     Answer:   No       Medications prescribed:  Outpatient Encounter Medications as of 6/27/2019   Medication Sig Dispense Refill   • norgestimate-ethinyl estradiol (TRI-SPRINTEC) 0.18/0.215/0.25 MG-35 MCG per tablet Take 1 tablet by mouth Daily. 84 tablet 4   • Thyroid (ARMOUR THYROID) 90 MG PO tablet Take 0.5 tablets by mouth Daily. 90 tablet 3   • [DISCONTINUED] ARMOUR THYROID 90 MG tablet TAKE 1/2 TABLET BY MOUTH DAILY 15 tablet 0     No facility-administered encounter medications on file as of 6/27/2019.      Bone Health           Lab Results   Component Value Date     CALCIUM 9.3 02/23/2018     XTET14BY 28.4 (L) 02/23/2018            Component      Latest Ref Rng & Units 6/26/2019   25 Hydroxy, Vitamin D      30.0 - 100.0 ng/ml 34.0     Taking MVI daily- continue        Thyroid Health         Lab Results   Component Value Date    TSH 1.820 06/26/2019              armour 90 mg ,taking half a tab         Other Diabetes Related Aspects      Lab Results   Component Value Date    TLLKIIOS49 285 02/23/2018                   Last celiac panel            Lab Results   Component Value Date     GDPABIGA 6 05/09/2016     TTRANSGLIGA <2 05/09/2016      05/09/2016             4. Return in about 1 year (around 6/27/2020) for Recheck.

## 2019-06-28 ENCOUNTER — TELEPHONE (OUTPATIENT)
Dept: FAMILY MEDICINE CLINIC | Facility: CLINIC | Age: 34
End: 2019-06-28

## 2019-06-28 LAB
GEN CATEG CVX/VAG CYTO-IMP: ABNORMAL
HSV1 IGM TITR SER IF: NORMAL TITER
HSV2 IGM TITR SER IF: NORMAL TITER
LAB AP CASE REPORT: ABNORMAL
LAB AP GYN ADDITIONAL INFORMATION: ABNORMAL
LAB AP GYN OTHER FINDINGS: ABNORMAL
PATH INTERP SPEC-IMP: ABNORMAL
STAT OF ADQ CVX/VAG CYTO-IMP: ABNORMAL

## 2019-06-30 NOTE — PROGRESS NOTES
Answers for HPI/ROS submitted by the patient on 6/10/2019   Female genitourinary complaint  genital itching: Yes  genital lesions: Yes  genital odor: No  genital rash: No  missed menses: No  pelvic pain: No  vaginal bleeding: No  vaginal discharge: Yes  Chronicity: new  Onset: in the past 7 days  Frequency: constantly  Progression since onset: gradually worsening  Severity of pain: moderate  Affected side: left  Pregnant now?: No  abdominal pain: No  anorexia: No  back pain: No  chills: Yes  constipation: No  diarrhea: No  discolored urine: No  dysuria: Yes  fever: No  flank pain: No  frequency: No  headaches: No  hematuria: No  joint pain: No  joint swelling: No  nausea: No  painful intercourse: Yes  rash: No  sore throat: No  urgency: No  vomiting: No  Aggravated by: nothing  Sexual activity: sexually active  Partner with STD symptoms: yes  Birth control: condoms, oral contraceptives  Menstrual history: regular  Discharge characteristics: clear, watery  Passing clots?: No  Passing tissue?: No

## 2019-06-30 NOTE — PROGRESS NOTES
Subjective   Mary Ann Mondragon is a 34 y.o. female. Patient here today with complaints of Vaginitis  Patient here today with complaints of possible yeast infection, vaginitis since being on Amoxil for 10 days for sore throat.  She says that her symptoms of vaginal itching and discharge started a couple of days ago, she used Monistat without any symptom relief.  Also reports that her partner has been diagnosed with genital herpes in the past, we have had a long discussion about this previously and she is aware to use protection with intercourse which she reports she has been doing.    Vitals:    06/10/19 1407   BP: 110/68   Pulse: 88   Temp: 98.4 °F (36.9 °C)     Past Medical History:   Diagnosis Date   • Abdominal pain, epigastric    • Acquired hypothyroidism    • Acute pharyngitis    • Carbuncle/boil     left axilla   • Contact dermatitis    • Contraception    • Cough    • Dysmenorrhea    • Dysuria    • Encounter for gynecological examination (general) (routine) without abnormal findings    • Epigastric pain    • Fatigue    • GERD (gastroesophageal reflux disease)    • Hashimoto's thyroiditis    • Heartburn    • Increased frequency of urination    • Knee pain    • Multiple joint pain    • Muscle pain    • Nausea    • Neoplasm of ovary    • Pain in wrist     Right wrist.   • Pelvic pain    • Potential Infectious disease contact    • Special examinations and investigations     Gynecological examination   • Surveillance of oral contraception done    • Syncope    • Upper respiratory infection    • Visit for gynecologic examination    • Vitamin D deficiency    • Vitamin deficiency      Vaginitis   This is a new problem. The current episode started in the past 7 days. The problem occurs constantly. The problem has been unchanged. Associated symptoms include chills. Pertinent negatives include no abdominal pain, anorexia, fever, headaches, nausea, rash, sore throat or vomiting. Nothing aggravates the symptoms. She  has tried nothing for the symptoms. The treatment provided no relief.   Female  Problem   The patient's primary symptoms include genital itching, genital lesions and vaginal discharge. The patient's pertinent negatives include no genital odor, genital rash, missed menses, pelvic pain or vaginal bleeding. This is a new problem. The current episode started in the past 7 days. The problem occurs constantly. The problem has been gradually worsening. The pain is moderate. The problem affects the left side. She is not pregnant. Associated symptoms include chills, dysuria and painful intercourse. Pertinent negatives include no abdominal pain, anorexia, back pain, constipation, diarrhea, discolored urine, fever, flank pain, frequency, headaches, hematuria, joint pain, joint swelling, nausea, rash, sore throat, urgency or vomiting. The vaginal discharge was clear and watery. She has not been passing clots. She has not been passing tissue. Nothing aggravates the symptoms. She has tried antifungals for the symptoms. The treatment provided no relief. She is sexually active. Yes, her partner has an STD. She uses condoms and oral contraceptives for contraception. Her menstrual history has been regular.        The following portions of the patient's history were reviewed and updated as appropriate: allergies, current medications, past family history, past medical history, past social history, past surgical history and problem list.    Review of Systems   Constitutional: Positive for chills. Negative for fever.   HENT: Negative.  Negative for sore throat.    Eyes: Negative.    Respiratory: Negative.    Cardiovascular: Negative.    Gastrointestinal: Negative.  Negative for abdominal pain, anorexia, constipation, diarrhea, nausea and vomiting.   Endocrine: Negative.    Genitourinary: Positive for dysuria and vaginal discharge. Negative for flank pain, frequency, hematuria, missed menses, pelvic pain and urgency.   Musculoskeletal:  Negative.  Negative for back pain and joint pain.   Skin: Negative.  Negative for rash.   Allergic/Immunologic: Negative.    Neurological: Negative.  Negative for headaches.   Hematological: Negative.    Psychiatric/Behavioral: Negative.        Objective   Physical Exam   Constitutional: She is oriented to person, place, and time. She appears well-developed and well-nourished. No distress.   HENT:   Head: Normocephalic and atraumatic.   Cardiovascular: Normal rate, regular rhythm and normal heart sounds. Exam reveals no gallop and no friction rub.   No murmur heard.  Pulmonary/Chest: Effort normal and breath sounds normal. No stridor. No respiratory distress. She has no wheezes. She has no rales.   Neurological: She is alert and oriented to person, place, and time.   Skin: Skin is warm and dry. She is not diaphoretic.   Psychiatric: She has a normal mood and affect. Her behavior is normal.   Nursing note and vitals reviewed.      Assessment/Plan   Mary Ann was seen today for vaginitis.    Diagnoses and all orders for this visit:    Acute vaginitis    Exposure to genital herpes    Other orders  -     Discontinue: fluconazole (DIFLUCAN) 200 MG tablet; Take 1 tablet by mouth Daily.    since she did have 10d amoxil recently, I will treat her with diflucan without obtaining STD swabs, BV or yeast swabs. However pt is advised to continue use of condoms when sexually active and should symptoms persist or worsen she is asked to RTC for recheck. Will likely need vaginal swab at that time if symptoms do not resolve. She is aware and is in agreement to this plan. All questions and concerns are addressed with understanding noted.

## 2019-07-01 PROCEDURE — 87624 HPV HI-RISK TYP POOLED RSLT: CPT | Performed by: NURSE PRACTITIONER

## 2019-07-04 LAB — HPV I/H RISK 4 DNA CVX QL PROBE+SIG AMP: NEGATIVE

## 2019-08-19 ENCOUNTER — OFFICE VISIT (OUTPATIENT)
Dept: FAMILY MEDICINE CLINIC | Facility: CLINIC | Age: 34
End: 2019-08-19

## 2019-08-19 ENCOUNTER — LAB (OUTPATIENT)
Dept: LAB | Facility: OTHER | Age: 34
End: 2019-08-19

## 2019-08-19 VITALS
TEMPERATURE: 99.9 F | DIASTOLIC BLOOD PRESSURE: 56 MMHG | SYSTOLIC BLOOD PRESSURE: 110 MMHG | OXYGEN SATURATION: 98 % | WEIGHT: 114 LBS | HEART RATE: 102 BPM | HEIGHT: 64 IN | BODY MASS INDEX: 19.46 KG/M2

## 2019-08-19 DIAGNOSIS — N76.0 ACUTE VAGINITIS: Primary | ICD-10-CM

## 2019-08-19 DIAGNOSIS — R68.83 CHILLS: ICD-10-CM

## 2019-08-19 DIAGNOSIS — R50.9 FEVER, UNSPECIFIED FEVER CAUSE: ICD-10-CM

## 2019-08-19 DIAGNOSIS — M79.10 MYALGIA: ICD-10-CM
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
## 2019-08-19 LAB
ALBUMIN SERPL-MCNC: 4.6 G/DL (ref 3.5–5)
ALBUMIN/GLOB SERPL: 1.4 G/DL (ref 1.1–1.8)
ALP SERPL-CCNC: 44 U/L (ref 38–126)
ALT SERPL W P-5'-P-CCNC: 14 U/L
ANION GAP SERPL CALCULATED.3IONS-SCNC: 10 MMOL/L (ref 5–15)
AST SERPL-CCNC: 27 U/L (ref 14–36)
BACTERIA UR QL AUTO: ABNORMAL /HPF
BASOPHILS # BLD AUTO: 0.01 10*3/MM3 (ref 0–0.2)
BASOPHILS NFR BLD AUTO: 0.2 % (ref 0–1.5)
BILIRUB SERPL-MCNC: 0.3 MG/DL (ref 0.2–1.3)
BILIRUB UR QL STRIP: NEGATIVE
BUN BLD-MCNC: 15 MG/DL (ref 7–23)
BUN/CREAT SERPL: 21.7 (ref 7–25)
CALCIUM SPEC-SCNC: 9.7 MG/DL (ref 8.4–10.2)
CHLORIDE SERPL-SCNC: 103 MMOL/L (ref 101–112)
CLARITY UR: ABNORMAL
CO2 SERPL-SCNC: 28 MMOL/L (ref 22–30)
COLOR UR: YELLOW
CREAT BLD-MCNC: 0.69 MG/DL (ref 0.52–1.04)
DEPRECATED RDW RBC AUTO: 41.8 FL (ref 37–54)
EOSINOPHIL # BLD AUTO: 0.02 10*3/MM3 (ref 0–0.4)
EOSINOPHIL NFR BLD AUTO: 0.3 % (ref 0.3–6.2)
ERYTHROCYTE [DISTWIDTH] IN BLOOD BY AUTOMATED COUNT: 12.7 % (ref 12.3–15.4)
GFR SERPL CREATININE-BSD FRML MDRD: 96 ML/MIN/1.73 (ref 64–149)
GLOBULIN UR ELPH-MCNC: 3.2 GM/DL (ref 2.3–3.5)
GLUCOSE BLD-MCNC: 104 MG/DL (ref 70–99)
GLUCOSE UR STRIP-MCNC: ABNORMAL MG/DL
HCT VFR BLD AUTO: 40.3 % (ref 34–46.6)
HETEROPH AB SER QL LA: NEGATIVE
HGB BLD-MCNC: 13.6 G/DL (ref 12–15.9)
HGB UR QL STRIP.AUTO: ABNORMAL
HYALINE CASTS UR QL AUTO: ABNORMAL /LPF
KETONES UR QL STRIP: NEGATIVE
LEUKOCYTE ESTERASE UR QL STRIP.AUTO: NEGATIVE
LYMPHOCYTES # BLD AUTO: 0.95 10*3/MM3 (ref 0.7–3.1)
LYMPHOCYTES NFR BLD AUTO: 14.7 % (ref 19.6–45.3)
MCH RBC QN AUTO: 31.3 PG (ref 26.6–33)
MCHC RBC AUTO-ENTMCNC: 33.7 G/DL (ref 31.5–35.7)
MCV RBC AUTO: 92.6 FL (ref 79–97)
MONOCYTES # BLD AUTO: 0.68 10*3/MM3 (ref 0.1–0.9)
MONOCYTES NFR BLD AUTO: 10.5 % (ref 5–12)
MUCOUS THREADS URNS QL MICRO: ABNORMAL /HPF
NEUTROPHILS # BLD AUTO: 4.8 10*3/MM3 (ref 1.7–7)
NEUTROPHILS NFR BLD AUTO: 74.3 % (ref 42.7–76)
NITRITE UR QL STRIP: NEGATIVE
PH UR STRIP.AUTO: 5.5 [PH] (ref 5.5–8)
PLATELET # BLD AUTO: 138 10*3/MM3 (ref 140–450)
PMV BLD AUTO: 11.6 FL (ref 6–12)
POTASSIUM BLD-SCNC: 4.2 MMOL/L (ref 3.4–5)
PROT SERPL-MCNC: 7.8 G/DL (ref 6.3–8.6)
PROT UR QL STRIP: ABNORMAL
RBC # BLD AUTO: 4.35 10*6/MM3 (ref 3.77–5.28)
RBC # UR: ABNORMAL /HPF
REF LAB TEST METHOD: ABNORMAL
SODIUM BLD-SCNC: 141 MMOL/L (ref 137–145)
SP GR UR STRIP: >=1.03 (ref 1–1.03)
SQUAMOUS #/AREA URNS HPF: ABNORMAL /HPF
UROBILINOGEN UR QL STRIP: ABNORMAL
WBC NRBC COR # BLD: 6.46 10*3/MM3 (ref 3.4–10.8)
WBC UR QL AUTO: ABNORMAL /HPF

## 2019-08-19 PROCEDURE — 86666 EHRLICHIA ANTIBODY: CPT | Performed by: NURSE PRACTITIONER

## 2019-08-19 PROCEDURE — 86308 HETEROPHILE ANTIBODY SCREEN: CPT | Performed by: NURSE PRACTITIONER

## 2019-08-19 PROCEDURE — 85025 COMPLETE CBC W/AUTO DIFF WBC: CPT | Performed by: NURSE PRACTITIONER

## 2019-08-19 PROCEDURE — 86618 LYME DISEASE ANTIBODY: CPT | Performed by: NURSE PRACTITIONER

## 2019-08-19 PROCEDURE — 36415 COLL VENOUS BLD VENIPUNCTURE: CPT | Performed by: NURSE PRACTITIONER

## 2019-08-19 PROCEDURE — 80053 COMPREHEN METABOLIC PANEL: CPT | Performed by: NURSE PRACTITIONER

## 2019-08-19 PROCEDURE — 81001 URINALYSIS AUTO W/SCOPE: CPT | Performed by: NURSE PRACTITIONER

## 2019-08-19 PROCEDURE — 86757 RICKETTSIA ANTIBODY: CPT | Performed by: NURSE PRACTITIONER

## 2019-08-19 PROCEDURE — 99214 OFFICE O/P EST MOD 30 MIN: CPT | Performed by: NURSE PRACTITIONER

## 2019-08-19 RX ORDER — METRONIDAZOLE 7.5 MG/G
GEL VAGINAL 2 TIMES DAILY
Qty: 70 G | Refills: 0 | Status: SHIPPED | OUTPATIENT
Start: 2019-08-19 | End: 2019-08-24

## 2019-08-19 RX ORDER — RANITIDINE 150 MG/1
150 TABLET ORAL AS NEEDED
COMMUNITY
End: 2019-11-04

## 2019-08-19 RX ORDER — FLUTICASONE PROPIONATE 50 MCG
2 SPRAY, SUSPENSION (ML) NASAL DAILY
COMMUNITY
End: 2021-06-25

## 2019-08-21 ENCOUNTER — OFFICE VISIT (OUTPATIENT)
Dept: FAMILY MEDICINE CLINIC | Facility: CLINIC | Age: 34
End: 2019-08-21

## 2019-08-21 VITALS
SYSTOLIC BLOOD PRESSURE: 118 MMHG | HEIGHT: 64 IN | BODY MASS INDEX: 18.98 KG/M2 | WEIGHT: 111.2 LBS | HEART RATE: 59 BPM | TEMPERATURE: 99.3 F | DIASTOLIC BLOOD PRESSURE: 58 MMHG

## 2019-08-21 DIAGNOSIS — Z72.51 HIGH RISK SEXUAL BEHAVIOR, UNSPECIFIED TYPE: Primary | ICD-10-CM

## 2019-08-21 DIAGNOSIS — N76.0 ACUTE VAGINITIS: ICD-10-CM

## 2019-08-21 LAB — B BURGDOR IGG+IGM SER-ACNC: <0.91 ISR (ref 0–0.9)

## 2019-08-21 PROCEDURE — 87255 GENET VIRUS ISOLATE HSV: CPT | Performed by: NURSE PRACTITIONER

## 2019-08-21 PROCEDURE — 99213 OFFICE O/P EST LOW 20 MIN: CPT | Performed by: NURSE PRACTITIONER

## 2019-08-22 LAB
A PHAGOCYTOPH IGM TITR SER IF: NEGATIVE {TITER}
CONV HGE IGG TITER: NEGATIVE
E CHAFFEENSIS IGG TITR SER IF: NEGATIVE {TITER}
E. CHAFFEENSIS (HME) IGM TITER: NEGATIVE
R RICKETTSI IGG SER QL IA: NEGATIVE
R RICKETTSI IGM TITR SER: 0.52 INDEX (ref 0–0.89)

## 2019-08-22 RX ORDER — LIDOCAINE 50 MG/G
OINTMENT TOPICAL
Qty: 30 G | Refills: 0 | Status: SHIPPED | OUTPATIENT
Start: 2019-08-22 | End: 2020-02-27 | Stop reason: SDUPTHER

## 2019-08-22 NOTE — PROGRESS NOTES
Answers for HPI/ROS submitted by the patient on 8/21/2019   Female genitourinary complaint  genital itching: No  genital lesions: Yes  genital odor: No  genital rash: Yes  missed menses: No  pelvic pain: No  vaginal bleeding: No  vaginal discharge: Yes  Chronicity: new  Onset: yesterday  Frequency: constantly  Progression since onset: gradually worsening  Severity of pain: moderate  Affected side: both  Pregnant now?: No  abdominal pain: No  anorexia: No  back pain: No  chills: Yes  constipation: No  diarrhea: No  discolored urine: No  dysuria: Yes  fever: Yes  flank pain: No  frequency: No  headaches: Yes  hematuria: No  joint pain: Yes  joint swelling: No  nausea: No  painful intercourse: No  rash: Yes  sore throat: No  urgency: No  vomiting: No  Aggravated by: urinating  Sexual activity: sexually active  Partner with STD symptoms: yes  Birth control: condoms, oral contraceptives  Menstrual history: regular  Discharge characteristics: mucoid, yellow  Passing clots?: No  Passing tissue?: No

## 2019-08-22 NOTE — PROGRESS NOTES
Subjective   Mary Ann Mondragon is a 34 y.o. female. Patient here today with complaints of Follow-up and Vaginitis  pt here today with complaints of vaginal burning, stinging, externally and symptoms present x days, worsening. States has had known exposure to genital herpes and is concerned about now developing this herself. Was treated for BV with metrogel a couple of days ago, vaginal discharge improved, continues on metrogel now.     Vitals:    08/21/19 1304   BP: 118/58   Pulse: 59   Temp: 99.3 °F (37.4 °C)     Past Medical History:   Diagnosis Date   • Abdominal pain, epigastric    • Acquired hypothyroidism    • Acute pharyngitis    • Carbuncle/boil     left axilla   • Contact dermatitis    • Contraception    • Cough    • Dysmenorrhea    • Dysuria    • Encounter for gynecological examination (general) (routine) without abnormal findings    • Epigastric pain    • Fatigue    • GERD (gastroesophageal reflux disease)    • Hashimoto's thyroiditis    • Heartburn    • Increased frequency of urination    • Knee pain    • Multiple joint pain    • Muscle pain    • Nausea    • Neoplasm of ovary    • Pain in wrist     Right wrist.   • Pelvic pain    • Potential Infectious disease contact    • Special examinations and investigations     Gynecological examination   • Surveillance of oral contraception done    • Syncope    • Upper respiratory infection    • Visit for gynecologic examination    • Vitamin D deficiency    • Vitamin deficiency      Vaginitis   This is a new problem. The current episode started in the past 7 days. The problem occurs constantly. The problem has been gradually worsening. Associated symptoms include chills, a fever, headaches and a rash. Pertinent negatives include no abdominal pain, anorexia, nausea, sore throat or vomiting. Nothing aggravates the symptoms. She has tried nothing for the symptoms. The treatment provided no relief.   Female  Problem   The patient's primary symptoms include  genital lesions, a genital rash and vaginal discharge. The patient's pertinent negatives include no genital itching, genital odor, missed menses, pelvic pain or vaginal bleeding. This is a new problem. The current episode started yesterday. The problem occurs constantly. The problem has been gradually worsening. The pain is moderate. The problem affects both sides. She is not pregnant. Associated symptoms include chills, dysuria, a fever, headaches, joint pain and rash. Pertinent negatives include no abdominal pain, anorexia, back pain, constipation, diarrhea, discolored urine, flank pain, frequency, hematuria, joint swelling, nausea, painful intercourse, sore throat, urgency or vomiting. The vaginal discharge was mucoid and yellow. She has not been passing clots. She has not been passing tissue. The symptoms are aggravated by tactile pressure. She is sexually active. Yes, her partner has an STD. She uses condoms and oral contraceptives for contraception. Her menstrual history has been regular.        The following portions of the patient's history were reviewed and updated as appropriate: allergies, current medications, past family history, past medical history, past social history, past surgical history and problem list.    Review of Systems   Constitutional: Positive for chills and fever.   HENT: Negative for sore throat.    Eyes: Negative.    Respiratory: Negative.    Cardiovascular: Negative.    Gastrointestinal: Negative.  Negative for abdominal pain, anorexia, constipation, diarrhea, nausea and vomiting.   Endocrine: Negative.    Genitourinary: Positive for dysuria and vaginal discharge. Negative for flank pain, frequency, hematuria, missed menses, pelvic pain and urgency.   Musculoskeletal: Positive for joint pain. Negative for back pain.   Skin: Positive for rash.   Allergic/Immunologic: Negative.    Neurological: Positive for headaches.   Hematological: Negative.    Psychiatric/Behavioral: Negative.         Objective   Physical Exam   Constitutional: She is oriented to person, place, and time. She appears well-developed and well-nourished. No distress.   HENT:   Head: Normocephalic and atraumatic.   Cardiovascular: Normal rate, regular rhythm and normal heart sounds. Exam reveals no gallop and no friction rub.   No murmur heard.  Pulmonary/Chest: Effort normal and breath sounds normal. No stridor. No respiratory distress. She has no wheezes. She has no rales.   Genitourinary:       No labial fusion. There is tenderness (erythematous lesion, tender to palp, reports burning/stinging around area, sm lacersation vs herpes lesion on exam , not draining, pea-sized area, oblong lesion ) and lesion on the left labia. There is no rash on the left labia. Vaginal discharge (sm amt, yellowish in color ) found.   Neurological: She is alert and oriented to person, place, and time.   Skin: Skin is warm and dry. She is not diaphoretic.   Psychiatric: She has a normal mood and affect. Her behavior is normal.   Nursing note and vitals reviewed.      Assessment/Plan   Mary Ann was seen today for follow-up and vaginitis.    Diagnoses and all orders for this visit:    High risk sexual behavior, unspecified type  -     HSV 1 & 2 - Specific Antibody, IgG; Future  -     HSV 1 & 2 IgM, Antibodies, Indirect; Future  -     Herpes Simplex Virus Culture - Swab, Vagina    Acute vaginitis    she is treated with lidocaine oint topically prn pain, swabs and serum labs obtained for HSV1/HSV2 and pt will be informed of results when they return via phone. She is aware to use safe sex practices. She is aware and is in agreement to this plan. Will continue to use metrogel as was prescribed previously. All questions and concerns are addressed with understanding noted.

## 2019-08-24 LAB — HSV SPEC CULT: POSITIVE

## 2019-08-29 ENCOUNTER — PATIENT MESSAGE (OUTPATIENT)
Dept: FAMILY MEDICINE CLINIC | Facility: CLINIC | Age: 34
End: 2019-08-29

## 2019-09-03 RX ORDER — VALACYCLOVIR HYDROCHLORIDE 1 G/1
1000 TABLET, FILM COATED ORAL 2 TIMES DAILY
Qty: 20 TABLET | Refills: 0 | Status: SHIPPED | OUTPATIENT
Start: 2019-09-03 | End: 2019-10-10 | Stop reason: SDUPTHER

## 2019-09-03 NOTE — TELEPHONE ENCOUNTER
From: Mary Ann Mondragon  To: Kaylee Osullivan APRN  Sent: 8/29/2019 9:17 AM CDT  Subject: Prescription Question    The symptoms from the HSV diagnosis haven't let up. Sunday of this week will be two weeks. I'm needing a prescription for an antiviral.

## 2019-09-25 ENCOUNTER — OFFICE VISIT (OUTPATIENT)
Dept: FAMILY MEDICINE CLINIC | Facility: CLINIC | Age: 34
End: 2019-09-25

## 2019-09-25 VITALS
WEIGHT: 112 LBS | TEMPERATURE: 98.7 F | SYSTOLIC BLOOD PRESSURE: 118 MMHG | HEART RATE: 87 BPM | HEIGHT: 64 IN | OXYGEN SATURATION: 98 % | DIASTOLIC BLOOD PRESSURE: 78 MMHG | BODY MASS INDEX: 19.12 KG/M2

## 2019-09-25 DIAGNOSIS — R87.619 ABNORMAL CERVICAL PAPANICOLAOU SMEAR, UNSPECIFIED ABNORMAL PAP FINDING: Primary | ICD-10-CM

## 2019-09-25 DIAGNOSIS — N76.0 ACUTE VAGINITIS: ICD-10-CM

## 2019-09-25 PROCEDURE — 88141 CYTOPATH C/V INTERPRET: CPT | Performed by: PATHOLOGY

## 2019-09-25 PROCEDURE — 88142 CYTOPATH C/V THIN LAYER: CPT | Performed by: NURSE PRACTITIONER

## 2019-09-25 PROCEDURE — 99213 OFFICE O/P EST LOW 20 MIN: CPT | Performed by: NURSE PRACTITIONER

## 2019-09-25 RX ORDER — METRONIDAZOLE 7.5 MG/G
GEL VAGINAL 2 TIMES DAILY
Qty: 70 G | Refills: 0 | Status: SHIPPED | OUTPATIENT
Start: 2019-09-25 | End: 2019-09-30

## 2019-09-25 NOTE — PROGRESS NOTES
Subjective   Mary Ann Mondragon is a 34 y.o. female. Patient here today with complaints of Abnormal Pap Smear  pt here today for repeat pap, states recent hx of herpes infection which cleared with treatment, last pap revealed atypical cells, this was performed 3 months ago. Here for recheck. Also reports was treated for BV approx 2 months ago with metrogel and reports still does have vaginal discharge off and on still.     Vitals:    09/25/19 1428   BP: 118/78   Pulse: 87   Temp: 98.7 °F (37.1 °C)   SpO2: 98%     Past Medical History:   Diagnosis Date   • Abdominal pain, epigastric    • Acquired hypothyroidism    • Acute pharyngitis    • Carbuncle/boil     left axilla   • Contact dermatitis    • Contraception    • Cough    • Dysmenorrhea    • Dysuria    • Encounter for gynecological examination (general) (routine) without abnormal findings    • Epigastric pain    • Fatigue    • GERD (gastroesophageal reflux disease)    • Hashimoto's thyroiditis    • Heartburn    • Increased frequency of urination    • Knee pain    • Multiple joint pain    • Muscle pain    • Nausea    • Neoplasm of ovary    • Pain in wrist     Right wrist.   • Pelvic pain    • Potential Infectious disease contact    • Special examinations and investigations     Gynecological examination   • Surveillance of oral contraception done    • Syncope    • Upper respiratory infection    • Visit for gynecologic examination    • Vitamin D deficiency    • Vitamin deficiency      Vaginitis   This is a new problem. The current episode started in the past 7 days. The problem occurs constantly. The problem has been waxing and waning. Nothing aggravates the symptoms. Treatments tried: metrogel approx 2 months ago  The treatment provided moderate relief.        The following portions of the patient's history were reviewed and updated as appropriate: allergies, current medications, past family history, past medical history, past social history, past surgical  history and problem list.    Review of Systems   Constitutional: Negative.    HENT: Negative.    Eyes: Negative.    Respiratory: Negative.    Cardiovascular: Negative.    Gastrointestinal: Negative.    Endocrine: Negative.    Genitourinary: Positive for vaginal discharge.   Musculoskeletal: Negative.    Skin: Negative.    Allergic/Immunologic: Negative.    Neurological: Negative.    Hematological: Negative.    Psychiatric/Behavioral: Negative.        Objective   Physical Exam   Constitutional: She is oriented to person, place, and time. She appears well-developed and well-nourished. No distress.   HENT:   Head: Normocephalic and atraumatic.   Cardiovascular: Normal rate.   Pulmonary/Chest: Effort normal.   Genitourinary: Pelvic exam was performed with patient supine. No tenderness in the vagina. No signs of injury around the vagina. Vaginal discharge found.   Genitourinary Comments: She has a copious amt of thick yellowish discharge, cervix is erythematous and bleeding minimally after pap obtained. Pap is obtained from cervical os     Neurological: She is alert and oriented to person, place, and time.   Skin: Skin is warm and dry. She is not diaphoretic.   Psychiatric: She has a normal mood and affect. Her behavior is normal.   Nursing note and vitals reviewed.      Assessment/Plan   Mary Ann was seen today for abnormal pap smear.    Diagnoses and all orders for this visit:    Abnormal cervical Papanicolaou smear, unspecified abnormal pap finding  -     Liquid-based Pap Smear, Diagnostic    Acute vaginitis    Other orders  -     metroNIDAZOLE (METROGEL VAGINAL) 0.75 % vaginal gel; Insert  into the vagina 2 (Two) Times a Day for 5 days.    pap is obtained and sent to lab for cytology, will inform of results via phone, she is treated with metrogel again and understands potential side effects of medicine. She is aware and is in agreement to this plan. All questions and concerns are addressed with understanding noted.

## 2019-10-01 LAB
GEN CATEG CVX/VAG CYTO-IMP: NORMAL
LAB AP CASE REPORT: NORMAL
LAB AP GYN ADDITIONAL INFORMATION: NORMAL
LAB AP GYN OTHER FINDINGS: NORMAL
PATH INTERP SPEC-IMP: NORMAL
STAT OF ADQ CVX/VAG CYTO-IMP: NORMAL

## 2019-10-10 RX ORDER — VALACYCLOVIR HYDROCHLORIDE 1 G/1
1000 TABLET, FILM COATED ORAL 2 TIMES DAILY
Qty: 20 TABLET | Refills: 0 | Status: SHIPPED | OUTPATIENT
Start: 2019-10-10 | End: 2020-03-14 | Stop reason: SDUPTHER

## 2019-11-04 ENCOUNTER — OFFICE VISIT (OUTPATIENT)
Dept: FAMILY MEDICINE CLINIC | Facility: CLINIC | Age: 34
End: 2019-11-04

## 2019-11-04 VITALS
SYSTOLIC BLOOD PRESSURE: 110 MMHG | BODY MASS INDEX: 19.56 KG/M2 | TEMPERATURE: 98.7 F | HEART RATE: 79 BPM | HEIGHT: 64 IN | WEIGHT: 114.6 LBS | DIASTOLIC BLOOD PRESSURE: 68 MMHG

## 2019-11-04 DIAGNOSIS — F40.243 FEAR OF FLYING: Primary | ICD-10-CM

## 2019-11-04 PROCEDURE — 99213 OFFICE O/P EST LOW 20 MIN: CPT | Performed by: NURSE PRACTITIONER

## 2019-11-04 RX ORDER — LORAZEPAM 1 MG/1
1 TABLET ORAL TAKE AS DIRECTED
Qty: 4 TABLET | Refills: 0 | Status: SHIPPED | OUTPATIENT
Start: 2019-11-04 | End: 2020-03-09

## 2019-11-04 NOTE — PROGRESS NOTES
"Subjective   Mary Ann Mondragon is a 34 y.o. female. Patient here today with complaints of Anxiety  pt here today with complaints of anxiety with flying, states she has not flown for years but when she did she had a \"bad experience\", reports that she is flying to [place] in approx 3-4 weeks and would like to try medicine prior to flight.     Vitals:    11/04/19 1432   BP: 110/68   Pulse: 79   Temp: 98.7 °F (37.1 °C)   Weight: 52 kg (114 lb 9.6 oz)   Height: 162.6 cm (64\")   PainSc: 0-No pain     Body mass index is 19.67 kg/m².  Past Medical History:   Diagnosis Date   • Abdominal pain, epigastric    • Acquired hypothyroidism    • Acute pharyngitis    • Carbuncle/boil     left axilla   • Contact dermatitis    • Contraception    • Cough    • Dysmenorrhea    • Dysuria    • Encounter for gynecological examination (general) (routine) without abnormal findings    • Epigastric pain    • Fatigue    • GERD (gastroesophageal reflux disease)    • Hashimoto's thyroiditis    • Heartburn    • Increased frequency of urination    • Knee pain    • Multiple joint pain    • Muscle pain    • Nausea    • Neoplasm of ovary    • Pain in wrist     Right wrist.   • Pelvic pain    • Potential Infectious disease contact    • Special examinations and investigations     Gynecological examination   • Surveillance of oral contraception done    • Syncope    • Upper respiratory infection    • Visit for gynecologic examination    • Vitamin D deficiency    • Vitamin deficiency      Anxiety   Presents for follow-up visit. Symptoms include excessive worry and nervous/anxious behavior. Patient reports no shortness of breath. Symptoms occur rarely. The severity of symptoms is interfering with daily activities.            The following portions of the patient's history were reviewed and updated as appropriate: allergies, current medications, past family history, past medical history, past social history, past surgical history and problem " list.    Review of Systems   Constitutional: Negative.    HENT: Negative.    Eyes: Negative.    Respiratory: Negative.  Negative for shortness of breath.    Cardiovascular: Negative.    Gastrointestinal: Negative.    Endocrine: Negative.    Genitourinary: Negative.    Musculoskeletal: Negative.    Skin: Negative.    Allergic/Immunologic: Negative.    Neurological: Negative.    Hematological: Negative.    Psychiatric/Behavioral: The patient is nervous/anxious.        Objective   Physical Exam   Constitutional: She is oriented to person, place, and time. She appears well-developed and well-nourished. No distress.   HENT:   Head: Normocephalic and atraumatic.   Cardiovascular: Normal rate.   Pulmonary/Chest: Effort normal.   Neurological: She is alert and oriented to person, place, and time.   Skin: Skin is warm and dry. She is not diaphoretic.   Psychiatric: She has a normal mood and affect. Her behavior is normal. She is not actively hallucinating. She is attentive.   Nursing note and vitals reviewed.      Assessment/Plan   Mary Ann was seen today for anxiety.    Diagnoses and all orders for this visit:    Fear of flying    Other orders  -     LORazepam (ATIVAN) 1 MG tablet; Take 1 tablet by mouth Take As Directed.    dari is obtained and reviewed  Will give her ativan with instruction on how to use, how to take, advised on potential side effects of medicine  She is aware and is in agreement to this plan  Will follow up here as scheduled or prn otherwise  All questions and concerns are addressed with understanding noted.   DARI query complete. Treatment plan to include limited course of prescribed controlled substance. Risks including addiction, benefits, and alternatives presented to patient.

## 2019-11-23 PROCEDURE — 87480 CANDIDA DNA DIR PROBE: CPT | Performed by: NURSE PRACTITIONER

## 2019-11-23 PROCEDURE — 87660 TRICHOMONAS VAGIN DIR PROBE: CPT | Performed by: NURSE PRACTITIONER

## 2019-11-23 PROCEDURE — 87510 GARDNER VAG DNA DIR PROBE: CPT | Performed by: NURSE PRACTITIONER

## 2020-03-02 RX ORDER — LIDOCAINE 50 MG/G
OINTMENT TOPICAL
Qty: 30 G | Refills: 0 | Status: SHIPPED | OUTPATIENT
Start: 2020-03-02 | End: 2021-01-04

## 2020-03-09 ENCOUNTER — OFFICE VISIT (OUTPATIENT)
Dept: FAMILY MEDICINE CLINIC | Facility: CLINIC | Age: 35
End: 2020-03-09

## 2020-03-09 VITALS
TEMPERATURE: 98.4 F | BODY MASS INDEX: 19.87 KG/M2 | DIASTOLIC BLOOD PRESSURE: 76 MMHG | SYSTOLIC BLOOD PRESSURE: 116 MMHG | HEART RATE: 76 BPM | HEIGHT: 64 IN | WEIGHT: 116.4 LBS | OXYGEN SATURATION: 99 %

## 2020-03-09 DIAGNOSIS — R59.0 PREAURICULAR ADENOPATHY: ICD-10-CM

## 2020-03-09 DIAGNOSIS — R68.84 JAW PAIN: ICD-10-CM

## 2020-03-09 DIAGNOSIS — H92.01 RIGHT EAR PAIN: Primary | ICD-10-CM

## 2020-03-09 PROCEDURE — 99213 OFFICE O/P EST LOW 20 MIN: CPT | Performed by: NURSE PRACTITIONER

## 2020-03-09 RX ORDER — AMOXICILLIN AND CLAVULANATE POTASSIUM 500; 125 MG/1; MG/1
1 TABLET, FILM COATED ORAL 2 TIMES DAILY
Qty: 20 TABLET | Refills: 0 | Status: SHIPPED | OUTPATIENT
Start: 2020-03-09 | End: 2020-03-19

## 2020-03-16 RX ORDER — VALACYCLOVIR HYDROCHLORIDE 1 G/1
1000 TABLET, FILM COATED ORAL 2 TIMES DAILY
Qty: 20 TABLET | Refills: 0 | Status: SHIPPED | OUTPATIENT
Start: 2020-03-16 | End: 2020-07-17 | Stop reason: SDUPTHER

## 2020-03-17 NOTE — PROGRESS NOTES
"Answers for HPI/ROS submitted by the patient on 3/9/2020   Ear pain  What is the primary reason for your visit?: Ear Pain  Affected ear: right  Chronicity: new  Onset: in the past 7 days  Progression since onset: gradually worsening  Frequency: constantly  Fever: no fever  Pain - numeric: 4/10  abdominal pain: No  ear discharge: No  rash: No  cough: No  headaches: No  rhinorrhea: No  diarrhea: No  hearing loss: No  sore throat: No  neck pain: Yes  vomiting: No  Subjective   Mary Ann Mondragon is a 35 y.o. female. Patient here today with complaints of Earache (right side, x4 days )  pt here today with complaints of R ear pain vs R jaw pain which started a few days ago, states is \"slightly achy\" and she has restarted her allergy mes. Denies fever. Does report having noticed swelling to preauricular area on R.     Vitals:    03/09/20 0917   BP: 116/76   Pulse: 76   Temp: 98.4 °F (36.9 °C)   TempSrc: Tympanic   SpO2: 99%   Weight: 52.8 kg (116 lb 6.4 oz)   Height: 162.6 cm (64\")   PainSc:   4   PainLoc: Ear  Comment: right     Body mass index is 19.98 kg/m².  Past Medical History:   Diagnosis Date   • Abdominal pain, epigastric    • Acquired hypothyroidism    • Acute pharyngitis    • Carbuncle/boil     left axilla   • Contact dermatitis    • Contraception    • Cough    • Dysmenorrhea    • Dysuria    • Encounter for gynecological examination (general) (routine) without abnormal findings    • Epigastric pain    • Fatigue    • GERD (gastroesophageal reflux disease)    • Hashimoto's thyroiditis    • Heartburn    • Increased frequency of urination    • Knee pain    • Multiple joint pain    • Muscle pain    • Nausea    • Neoplasm of ovary    • Pain in wrist     Right wrist.   • Pelvic pain    • Potential Infectious disease contact    • Special examinations and investigations     Gynecological examination   • Surveillance of oral contraception done    • Syncope    • Upper respiratory infection    • Visit for gynecologic " examination    • Vitamin D deficiency    • Vitamin deficiency      Earache    There is pain in the right ear. This is a new problem. The current episode started in the past 7 days. The problem occurs constantly. The problem has been gradually worsening. There has been no fever. The pain is at a severity of 4/10. The pain is moderate. Associated symptoms include neck pain. Pertinent negatives include no abdominal pain, coughing, diarrhea, ear discharge, headaches, hearing loss, rash, rhinorrhea, sore throat or vomiting. She has tried nothing for the symptoms. The treatment provided no relief.        The following portions of the patient's history were reviewed and updated as appropriate: allergies, current medications, past family history, past medical history, past social history, past surgical history and problem list.    Review of Systems   Constitutional: Negative.    HENT: Positive for ear pain. Negative for ear discharge, hearing loss, rhinorrhea and sore throat.    Eyes: Negative.    Respiratory: Negative.  Negative for cough.    Cardiovascular: Negative.    Gastrointestinal: Negative.  Negative for abdominal pain, diarrhea and vomiting.   Endocrine: Negative.    Genitourinary: Negative.    Musculoskeletal: Positive for neck pain.   Skin: Negative.  Negative for rash.   Allergic/Immunologic: Negative.    Neurological: Negative.  Negative for headaches.   Hematological: Negative.    Psychiatric/Behavioral: Negative.        Objective   Physical Exam   Constitutional: She is oriented to person, place, and time. She appears well-developed and well-nourished. No distress.   HENT:   Head: Normocephalic and atraumatic.   Right Ear: There is tenderness. Tympanic membrane is bulging. No decreased hearing is noted.   Left Ear: No decreased hearing is noted.   Ears:    Nose: Nose normal. Right sinus exhibits no maxillary sinus tenderness and no frontal sinus tenderness. Left sinus exhibits no maxillary sinus tenderness  and no frontal sinus tenderness.   Mouth/Throat: Uvula is midline and mucous membranes are normal. Posterior oropharyngeal erythema (mild erythema ) present. No tonsillar exudate.   Swelling and tenderness noted to area encircled on graph   Neck: Neck supple.   Cardiovascular: Normal rate, regular rhythm and normal heart sounds. Exam reveals no gallop and no friction rub.   No murmur heard.  Pulmonary/Chest: Effort normal and breath sounds normal. No stridor. No respiratory distress. She has no wheezes. She has no rales.   Lymphadenopathy:     She has no cervical adenopathy.   Neurological: She is alert and oriented to person, place, and time.   Skin: Skin is warm and dry. No rash noted. She is not diaphoretic. No erythema. No pallor.   Psychiatric: She has a normal mood and affect. Her behavior is normal.   Nursing note and vitals reviewed.      Assessment/Plan   Mary Ann was seen today for earache.    Diagnoses and all orders for this visit:    Right ear pain    Jaw pain    Preauricular adenopathy    Other orders  -     amoxicillin-clavulanate (AUGMENTIN) 500-125 MG per tablet; Take 1 tablet by mouth 2 (Two) Times a Day for 10 days.    she is treated with augmentin as above for preauricular swelling, pain , lymphadenopathy  If symptoms persist or worsen she is asked to RTC for recheck  Needs to take tylenol/motrin prn pain or fever  She is aware and is in agreement to this plan  All questions and concerns are addressed with understanding noted.

## 2020-06-12 RX ORDER — LEVOTHYROXINE AND LIOTHYRONINE 57; 13.5 UG/1; UG/1
45 TABLET ORAL DAILY
Qty: 90 TABLET | Refills: 3 | Status: SHIPPED | OUTPATIENT
Start: 2020-06-12 | End: 2020-09-20 | Stop reason: SDUPTHER

## 2020-06-16 ENCOUNTER — LAB (OUTPATIENT)
Dept: LAB | Facility: HOSPITAL | Age: 35
End: 2020-06-16

## 2020-06-16 DIAGNOSIS — E55.9 VITAMIN D DEFICIENCY: ICD-10-CM

## 2020-06-16 DIAGNOSIS — E06.3 HYPOTHYROIDISM DUE TO HASHIMOTO'S THYROIDITIS: ICD-10-CM

## 2020-06-16 DIAGNOSIS — E03.8 HYPOTHYROIDISM DUE TO HASHIMOTO'S THYROIDITIS: ICD-10-CM

## 2020-06-16 LAB
25(OH)D3 SERPL-MCNC: 39.8 NG/ML (ref 30–100)
ALBUMIN SERPL-MCNC: 4.5 G/DL (ref 3.5–5.2)
ALBUMIN/GLOB SERPL: 1.7 G/DL
ALP SERPL-CCNC: 41 U/L (ref 39–117)
ALT SERPL W P-5'-P-CCNC: 12 U/L (ref 1–33)
ANION GAP SERPL CALCULATED.3IONS-SCNC: 9.5 MMOL/L (ref 5–15)
AST SERPL-CCNC: 20 U/L (ref 1–32)
BASOPHILS # BLD AUTO: 0.04 10*3/MM3 (ref 0–0.2)
BASOPHILS NFR BLD AUTO: 0.7 % (ref 0–1.5)
BILIRUB SERPL-MCNC: 0.5 MG/DL (ref 0.2–1.2)
BUN BLD-MCNC: 16 MG/DL (ref 6–20)
BUN/CREAT SERPL: 22.2 (ref 7–25)
CALCIUM SPEC-SCNC: 9.2 MG/DL (ref 8.6–10.5)
CHLORIDE SERPL-SCNC: 105 MMOL/L (ref 98–107)
CO2 SERPL-SCNC: 25.5 MMOL/L (ref 22–29)
CREAT BLD-MCNC: 0.72 MG/DL (ref 0.57–1)
DEPRECATED RDW RBC AUTO: 40.7 FL (ref 37–54)
EOSINOPHIL # BLD AUTO: 0.1 10*3/MM3 (ref 0–0.4)
EOSINOPHIL NFR BLD AUTO: 1.8 % (ref 0.3–6.2)
ERYTHROCYTE [DISTWIDTH] IN BLOOD BY AUTOMATED COUNT: 12.3 % (ref 12.3–15.4)
GFR SERPL CREATININE-BSD FRML MDRD: 91 ML/MIN/1.73
GLOBULIN UR ELPH-MCNC: 2.7 GM/DL
GLUCOSE BLD-MCNC: 90 MG/DL (ref 65–99)
HCT VFR BLD AUTO: 39 % (ref 34–46.6)
HGB BLD-MCNC: 13.2 G/DL (ref 12–15.9)
IMM GRANULOCYTES # BLD AUTO: 0.01 10*3/MM3 (ref 0–0.05)
IMM GRANULOCYTES NFR BLD AUTO: 0.2 % (ref 0–0.5)
LYMPHOCYTES # BLD AUTO: 1.96 10*3/MM3 (ref 0.7–3.1)
LYMPHOCYTES NFR BLD AUTO: 35.6 % (ref 19.6–45.3)
MCH RBC QN AUTO: 30.8 PG (ref 26.6–33)
MCHC RBC AUTO-ENTMCNC: 33.8 G/DL (ref 31.5–35.7)
MCV RBC AUTO: 91.1 FL (ref 79–97)
MONOCYTES # BLD AUTO: 0.38 10*3/MM3 (ref 0.1–0.9)
MONOCYTES NFR BLD AUTO: 6.9 % (ref 5–12)
NEUTROPHILS # BLD AUTO: 3.02 10*3/MM3 (ref 1.7–7)
NEUTROPHILS NFR BLD AUTO: 54.8 % (ref 42.7–76)
NRBC BLD AUTO-RTO: 0 /100 WBC (ref 0–0.2)
PLATELET # BLD AUTO: 187 10*3/MM3 (ref 140–450)
PMV BLD AUTO: 12.8 FL (ref 6–12)
POTASSIUM BLD-SCNC: 4.4 MMOL/L (ref 3.5–5.2)
PROT SERPL-MCNC: 7.2 G/DL (ref 6–8.5)
RBC # BLD AUTO: 4.28 10*6/MM3 (ref 3.77–5.28)
SODIUM BLD-SCNC: 140 MMOL/L (ref 136–145)
TSH SERPL DL<=0.05 MIU/L-ACNC: 2.12 UIU/ML (ref 0.27–4.2)
VIT B12 BLD-MCNC: 382 PG/ML (ref 211–946)
WBC NRBC COR # BLD: 5.51 10*3/MM3 (ref 3.4–10.8)

## 2020-06-16 PROCEDURE — 36415 COLL VENOUS BLD VENIPUNCTURE: CPT

## 2020-06-16 PROCEDURE — 80053 COMPREHEN METABOLIC PANEL: CPT

## 2020-06-16 PROCEDURE — 82607 VITAMIN B-12: CPT

## 2020-06-16 PROCEDURE — 85025 COMPLETE CBC W/AUTO DIFF WBC: CPT

## 2020-06-16 PROCEDURE — 82306 VITAMIN D 25 HYDROXY: CPT

## 2020-06-16 PROCEDURE — 84443 ASSAY THYROID STIM HORMONE: CPT

## 2020-06-26 ENCOUNTER — OFFICE VISIT (OUTPATIENT)
Dept: ENDOCRINOLOGY | Facility: CLINIC | Age: 35
End: 2020-06-26

## 2020-06-26 VITALS
HEART RATE: 83 BPM | BODY MASS INDEX: 19.27 KG/M2 | WEIGHT: 112.9 LBS | HEIGHT: 64 IN | SYSTOLIC BLOOD PRESSURE: 92 MMHG | OXYGEN SATURATION: 99 % | DIASTOLIC BLOOD PRESSURE: 71 MMHG

## 2020-06-26 DIAGNOSIS — E03.8 HYPOTHYROIDISM DUE TO HASHIMOTO'S THYROIDITIS: Primary | ICD-10-CM

## 2020-06-26 DIAGNOSIS — E53.8 B12 DEFICIENCY: ICD-10-CM

## 2020-06-26 DIAGNOSIS — K90.0 CELIAC DISEASE: ICD-10-CM

## 2020-06-26 DIAGNOSIS — E78.5 DYSLIPIDEMIA: ICD-10-CM

## 2020-06-26 DIAGNOSIS — E06.3 HYPOTHYROIDISM DUE TO HASHIMOTO'S THYROIDITIS: Primary | ICD-10-CM

## 2020-06-26 DIAGNOSIS — E55.9 VITAMIN D DEFICIENCY: ICD-10-CM

## 2020-06-26 PROCEDURE — 99214 OFFICE O/P EST MOD 30 MIN: CPT | Performed by: INTERNAL MEDICINE

## 2020-06-26 NOTE — PROGRESS NOTES
Subjective    Mary Ann Mondragon is a 35 y.o. female. she is here today for follow-up.    History of Present Illness       Primary Care Provider     Kaylee Osullivan, [facility]     36 yo comes for fu    Hypothyroidism,     Constant,   controlled    , mild severity       Lab Results   Component Value Date    TSH 2.120 06/16/2020           She is compliant      Bone health       Taking vitamin d          Evaluation history:  TSH   Date Value Ref Range Status   06/16/2020 2.120 0.270 - 4.200 uIU/mL Final     Free T4   Date Value Ref Range Status   05/18/2018 0.64 (L) 0.78 - 2.19 ng/dL Final     T3, Free   Date Value Ref Range Status   05/18/2018 3.4 2.0 - 4.4 pg/mL Final       Current medications:  Current Outpatient Medications   Medication Sig Dispense Refill   • fluticasone (FLONASE) 50 MCG/ACT nasal spray 2 sprays into the nostril(s) as directed by provider Daily.     • lidocaine (XYLOCAINE) 5 % ointment Apply  topically to the appropriate area as directed Every 2 (Two) Hours As Needed for Mild Pain . 30 g 0   • norgestimate-ethinyl estradiol (TRI-SPRINTEC) 0.18/0.215/0.25 MG-35 MCG per tablet Take 1 tablet by mouth Daily. 84 tablet 4   • Thyroid (ARMOUR THYROID) 90 MG PO tablet Take 0.5 tablets by mouth Daily. 90 tablet 3   • valACYclovir (VALTREX) 1000 MG tablet Take 1 tablet by mouth 2 (Two) Times a Day. 20 tablet 0     No current facility-administered medications for this visit.        The following portions of the patient's history were reviewed and updated as appropriate:   Past Medical History:   Diagnosis Date   • Abdominal pain, epigastric    • Acquired hypothyroidism    • Acute pharyngitis    • Carbuncle/boil     left axilla   • Contact dermatitis    • Contraception    • Cough    • Dysmenorrhea    • Dysuria    • Encounter for gynecological examination (general) (routine) without abnormal findings    • Epigastric pain    • Fatigue    • GERD (gastroesophageal reflux disease)    • Hashimoto's thyroiditis     • Heartburn    • Increased frequency of urination    • Knee pain    • Multiple joint pain    • Muscle pain    • Nausea    • Neoplasm of ovary    • Pain in wrist     Right wrist.   • Pelvic pain    • Potential Infectious disease contact    • Special examinations and investigations     Gynecological examination   • Surveillance of oral contraception done    • Syncope    • Upper respiratory infection    • Visit for gynecologic examination    • Vitamin D deficiency    • Vitamin deficiency      Past Surgical History:   Procedure Laterality Date   • ESOPHAGOSCOPY / EGD  06/08/2015    Normal esophagus. Gastritis found in the stomach. Biopsy taken. Normal duodenum.   • TONSILLECTOMY AND ADENOIDECTOMY     • TYMPANOSTOMY TUBE PLACEMENT       Family History   Problem Relation Age of Onset   • Ovarian cancer Other    • Stomach cancer Other    • CHRISTIANA disease Other      OB History    None       Allergies   Allergen Reactions   • Omeprazole Hives     (Prilosec)     Social History     Socioeconomic History   • Marital status: Single     Spouse name: Not on file   • Number of children: Not on file   • Years of education: Not on file   • Highest education level: Not on file   Tobacco Use   • Smoking status: Never Smoker   • Smokeless tobacco: Never Used   Substance and Sexual Activity   • Alcohol use: Yes     Comment: Occasionally       Review of Systems  Review of Systems   Constitutional: Negative for activity change, appetite change, diaphoresis and fatigue.   HENT: Negative for facial swelling, sneezing, sore throat, tinnitus, trouble swallowing and voice change.    Eyes: Negative for photophobia, pain, discharge, redness, itching and visual disturbance.   Respiratory: Negative for apnea, cough, choking, chest tightness and shortness of breath.    Cardiovascular: Negative for chest pain, palpitations and leg swelling.   Gastrointestinal: Negative for abdominal distention, abdominal pain, constipation, diarrhea, nausea and  "vomiting.   Endocrine: Negative for cold intolerance, heat intolerance, polydipsia, polyphagia and polyuria.   Genitourinary: Negative for difficulty urinating, dysuria, frequency, hematuria and urgency.   Musculoskeletal: Negative for arthralgias, back pain, gait problem, joint swelling, myalgias, neck pain and neck stiffness.   Skin: Negative for color change, pallor, rash and wound.   Neurological: Negative for dizziness, tremors, weakness, light-headedness, numbness and headaches.   Hematological: Negative for adenopathy. Does not bruise/bleed easily.   Psychiatric/Behavioral: Negative for behavioral problems, confusion and sleep disturbance.        Objective    BP 92/71 (BP Location: Right arm, Patient Position: Sitting, Cuff Size: Large Adult)   Pulse 83   Ht 162.6 cm (64\")   Wt 51.2 kg (112 lb 14.4 oz)   LMP 06/12/2020   SpO2 99%   BMI 19.38 kg/m²   Physical Exam   Constitutional: She is oriented to person, place, and time. She appears well-developed and well-nourished. No distress.   HENT:   Head: Normocephalic and atraumatic.   Right Ear: External ear normal.   Left Ear: External ear normal.   Nose: Nose normal.   Eyes: Pupils are equal, round, and reactive to light. Conjunctivae and EOM are normal.   Neck: Normal range of motion. Neck supple. No tracheal deviation present. No thyromegaly present.   Cardiovascular: Normal rate, regular rhythm and normal heart sounds.   No murmur heard.  Pulmonary/Chest: Effort normal and breath sounds normal. No respiratory distress. She has no wheezes.   Abdominal: Soft. Bowel sounds are normal. There is no tenderness. There is no rebound and no guarding.   Musculoskeletal: Normal range of motion. She exhibits no edema, tenderness or deformity.   Neurological: She is alert and oriented to person, place, and time. No cranial nerve deficit.   Skin: Skin is warm and dry. No rash noted.   Psychiatric: She has a normal mood and affect. Her behavior is normal. Judgment and " thought content normal.       Lab Review  Lab Results   Component Value Date    TSH 2.120 06/16/2020     Lab Results   Component Value Date    FREET4 0.64 (L) 05/18/2018        Assessment/Plan      1. Hypothyroidism due to Hashimoto's thyroiditis    2. Vitamin D deficiency    . This diagnosis was discussed and reviewed with the patient including the advantages of drug therapy.     1. Orders placed during this encounter include:  No orders of the defined types were placed in this encounter.      Medications prescribed:  Outpatient Encounter Medications as of 6/26/2020   Medication Sig Dispense Refill   • fluticasone (FLONASE) 50 MCG/ACT nasal spray 2 sprays into the nostril(s) as directed by provider Daily.     • lidocaine (XYLOCAINE) 5 % ointment Apply  topically to the appropriate area as directed Every 2 (Two) Hours As Needed for Mild Pain . 30 g 0   • norgestimate-ethinyl estradiol (TRI-SPRINTEC) 0.18/0.215/0.25 MG-35 MCG per tablet Take 1 tablet by mouth Daily. 84 tablet 4   • Thyroid (ARMOUR THYROID) 90 MG PO tablet Take 0.5 tablets by mouth Daily. 90 tablet 3   • valACYclovir (VALTREX) 1000 MG tablet Take 1 tablet by mouth 2 (Two) Times a Day. 20 tablet 0     No facility-administered encounter medications on file as of 6/26/2020.      Bone Health           Lab Results   Component Value Date     CALCIUM 9.3 02/23/2018     FRGH22IS 28.4 (L) 02/23/2018            Component      Latest Ref Rng & Units 6/26/2019   25 Hydroxy, Vitamin D      30.0 - 100.0 ng/ml 34.0     Taking MVI daily- continue        Thyroid Health         Lab Results   Component Value Date    TSH 2.120 06/16/2020              armour 90 mg ,taking half a tab         Other Diabetes Related Aspects      Lab Results   Component Value Date    [id number] 382 06/16/2020                   Last celiac panel            Lab Results   Component Value Date     GDPABIGA 6 05/09/2016     TTRANSGLIGA <2 05/09/2016      05/09/2016       mpv , ELEVATED,  REASSURED       4. No follow-ups on file.

## 2020-06-30 ENCOUNTER — OFFICE VISIT (OUTPATIENT)
Dept: FAMILY MEDICINE CLINIC | Facility: CLINIC | Age: 35
End: 2020-06-30

## 2020-06-30 ENCOUNTER — LAB (OUTPATIENT)
Dept: LAB | Facility: OTHER | Age: 35
End: 2020-06-30

## 2020-06-30 VITALS
SYSTOLIC BLOOD PRESSURE: 112 MMHG | HEART RATE: 76 BPM | HEIGHT: 64 IN | TEMPERATURE: 98.3 F | OXYGEN SATURATION: 98 % | DIASTOLIC BLOOD PRESSURE: 76 MMHG | BODY MASS INDEX: 19.63 KG/M2 | WEIGHT: 115 LBS

## 2020-06-30 DIAGNOSIS — R87.619 ABNORMAL CERVICAL PAPANICOLAOU SMEAR, UNSPECIFIED ABNORMAL PAP FINDING: ICD-10-CM

## 2020-06-30 DIAGNOSIS — Z30.41 ENCOUNTER FOR SURVEILLANCE OF CONTRACEPTIVE PILLS: ICD-10-CM

## 2020-06-30 DIAGNOSIS — Z01.419 WELL WOMAN EXAM WITH ROUTINE GYNECOLOGICAL EXAM: Primary | ICD-10-CM

## 2020-06-30 PROCEDURE — 99395 PREV VISIT EST AGE 18-39: CPT | Performed by: NURSE PRACTITIONER

## 2020-06-30 NOTE — PROGRESS NOTES
Subjective  Answers for HPI/ROS submitted by the patient on 2020   What is the primary reason for your visit?: Other  Please describe your symptoms.: None - yearly pap.  Have you had these symptoms before?: Yes  How long have you been having these symptoms?: 1-4 days    Mary Ann Mondragon is a 35 y.o. female. Patient here today with complaints of Gynecologic Exam  pt here today for well woman exam with pap testing, follow up on contraception and needing refills. States has been tolerating medicine well without side effects. Does report last month she had increased lower abd cramping approx 10d prior to menses but overall she states bleeding, cramping much improved on tri sprintec. Does have genital herpes and reports a breakout approx q5 weeks or so. Takes valtrex prn . Is sexually active without complaints. Does report that she has anxiety with flying, may need to fly for work in the future. The last time she flew she took phenergan which helped her nausea, anxiety and caused her to sleep during the flight. Is considering trying this again in the future if needed. She does have a family hx of ovarian cancer in mother who  at age 59.     Vitals:    20 0744   BP: 112/76   Pulse: 76   Temp: 98.3 °F (36.8 °C)   SpO2: 98%     Past Medical History:   Diagnosis Date   • Abdominal pain, epigastric    • Acquired hypothyroidism    • Acute pharyngitis    • Carbuncle/boil     left axilla   • Contact dermatitis    • Contraception    • Cough    • Dysmenorrhea    • Dysuria    • Encounter for gynecological examination (general) (routine) without abnormal findings    • Epigastric pain    • Fatigue    • GERD (gastroesophageal reflux disease)    • Hashimoto's thyroiditis    • Heartburn    • Increased frequency of urination    • Knee pain    • Multiple joint pain    • Muscle pain    • Nausea    • Neoplasm of ovary    • Pain in wrist     Right wrist.   • Pelvic pain    • Potential Infectious disease contact    •  Special examinations and investigations     Gynecological examination   • Surveillance of oral contraception done    • Syncope    • Upper respiratory infection    • Visit for gynecologic examination    • Vitamin D deficiency    • Vitamin deficiency        Past Surgical History:   Procedure Laterality Date   • ESOPHAGOSCOPY / EGD  2015    Normal esophagus. Gastritis found in the stomach. Biopsy taken. Normal duodenum.   • TONSILLECTOMY AND ADENOIDECTOMY     • TYMPANOSTOMY TUBE PLACEMENT       Social History     Socioeconomic History   • Marital status: Single     Spouse name: Not on file   • Number of children: 0   • Years of education: Not on file   • Highest education level: Not on file   Tobacco Use   • Smoking status: Never Smoker   • Smokeless tobacco: Never Used   Substance and Sexual Activity   • Alcohol use: Yes, 1-2 drinks per week     Comment: Occasionally     Sexual History:       OB History   [id number]     Menstrual History:  OB History    [id number]        Menarche age: 11  Patient's last menstrual period was 2020.         Gynecologic Exam   The patient's primary symptoms include vaginal discharge. This is a recurrent problem. The current episode started more than 1 month ago. The problem occurs constantly. The problem has been waxing and waning. The patient is experiencing no pain. She is not pregnant. Pertinent negatives include no painful intercourse. The vaginal discharge was yellow and thick. There has been no bleeding. Nothing aggravates the symptoms. She has tried nothing for the symptoms. The treatment provided no relief. She is sexually active. Yes (partner with herpes as well ), her partner has an STD. Her menstrual history has been regular. Her past medical history is significant for an STD. There is no history of a  section, an ectopic pregnancy, menorrhagia, metrorrhagia, miscarriage or a terminated pregnancy.        The following portions of the patient's history were  reviewed and updated as appropriate: allergies, current medications, past family history, past medical history, past social history, past surgical history and problem list.    Review of Systems   Constitutional: Negative.    HENT: Negative.    Eyes: Negative.    Respiratory: Negative.    Cardiovascular: Negative.    Gastrointestinal: Negative.    Endocrine: Negative.    Genitourinary: Positive for vaginal discharge. Negative for menorrhagia.   Musculoskeletal: Negative.    Skin: Negative.    Allergic/Immunologic: Negative.    Neurological: Negative.    Hematological: Negative.    Psychiatric/Behavioral: Negative.        Objective   Physical Exam   Constitutional: She is oriented to person, place, and time. She appears well-developed and well-nourished. No distress.   HENT:   Head: Normocephalic and atraumatic.   Nose: Nose normal.   Mouth/Throat: Oropharynx is clear and moist.   Eyes: EOM are normal. Right eye exhibits no discharge. Left eye exhibits no discharge.   Neck: Normal range of motion. Neck supple.   Cardiovascular: Normal rate, regular rhythm, normal heart sounds and intact distal pulses. Exam reveals no gallop and no friction rub.   No murmur heard.  Pulmonary/Chest: Effort normal and breath sounds normal. No stridor. No respiratory distress. She has no wheezes. She has no rales. Right breast exhibits no inverted nipple, no mass, no nipple discharge, no skin change and no tenderness. Left breast exhibits no inverted nipple, no mass, no nipple discharge, no skin change and no tenderness. No breast swelling, tenderness, discharge or bleeding. Breasts are symmetrical.   Abdominal: Soft. Bowel sounds are normal. She exhibits no distension and no mass. There is no tenderness. There is no rebound and no guarding. No hernia. Hernia confirmed negative in the right inguinal area and confirmed negative in the left inguinal area.   Genitourinary: Rectum normal and uterus normal. Rectal exam shows no external  hemorrhoid, no internal hemorrhoid, no fissure, no mass, no tenderness and anal tone normal. Pelvic exam was performed with patient supine. There is no rash, tenderness, lesion or injury on the right labia. There is no rash, tenderness, lesion or injury on the left labia. Uterus is not deviated, not enlarged, not fixed and not tender. Cervix exhibits no motion tenderness, no discharge and no friability. Right adnexum displays no mass, no tenderness and no fullness. Left adnexum displays no mass, no tenderness and no fullness. No erythema, tenderness or bleeding in the vagina. No foreign body in the vagina. No signs of injury around the vagina. Vaginal discharge found.   Genitourinary Comments: There is a large amount of thick, yellowish discharge noted on exam, tried to remove as much as possible prior to obtaining pap smear. Pap obtained from cervical os    Musculoskeletal: Normal range of motion. She exhibits no edema, tenderness or deformity.   Lymphadenopathy:        Right: No inguinal adenopathy present.        Left: No inguinal adenopathy present.   Neurological: She is alert and oriented to person, place, and time. She has normal reflexes. She displays normal reflexes. No cranial nerve deficit. She exhibits normal muscle tone. Coordination normal.   Skin: Skin is warm and dry. No rash noted. She is not diaphoretic. No erythema. No pallor.   Psychiatric: She has a normal mood and affect. Her behavior is normal. Judgment and thought content normal.   Nursing note and vitals reviewed.      Assessment/Plan   Mary Ann was seen today for gynecologic exam.    Diagnoses and all orders for this visit:    Well woman exam with routine gynecological exam    Abnormal cervical Papanicolaou smear, unspecified abnormal pap finding  -     Liquid-based Pap Smear, Screening    Encounter for surveillance of contraceptive pills      Lab Results (most recent)     None        Lab Results (most recent)     None        Pap obtained  and sent to lab for cytology, she will be informed of results via phone. she is give refills on OCPs x 1 year. Can consider prescribing phenergan if nec prior to flying in the future. Pt advised to contact me back if needed. We discussed changing her OCPs due to her having considerable lower abd cramping with last menses however since this was the first time she has experienced this she opts to continue with same birth control and will let me know if her symptoms occur again to reconsider changing. She is aware and is in agreement to this plan. Advised to continue SBEs monthly, continue with valtrex prn. All questions and concerns are addressed with understanding noted.

## 2020-07-08 LAB
LAB AP CASE REPORT: NORMAL
PATH INTERP SPEC-IMP: NORMAL

## 2020-07-16 ENCOUNTER — TELEMEDICINE (OUTPATIENT)
Dept: FAMILY MEDICINE CLINIC | Facility: TELEHEALTH | Age: 35
End: 2020-07-16

## 2020-07-16 DIAGNOSIS — R39.9 LOWER URINARY TRACT SYMPTOMS: Primary | ICD-10-CM

## 2020-07-16 PROCEDURE — 99212 OFFICE O/P EST SF 10 MIN: CPT | Performed by: NURSE PRACTITIONER

## 2020-07-16 RX ORDER — NITROFURANTOIN 25; 75 MG/1; MG/1
100 CAPSULE ORAL 2 TIMES DAILY
Qty: 14 CAPSULE | Refills: 0 | Status: SHIPPED | OUTPATIENT
Start: 2020-07-16 | End: 2020-07-23

## 2020-07-16 RX ORDER — PHENAZOPYRIDINE HYDROCHLORIDE 200 MG/1
200 TABLET, FILM COATED ORAL 3 TIMES DAILY PRN
Qty: 6 TABLET | Refills: 0 | Status: SHIPPED | OUTPATIENT
Start: 2020-07-16 | End: 2020-07-18

## 2020-07-16 NOTE — PROGRESS NOTES
CHIEF COMPLAINT  Chief Complaint   Patient presents with   • Urinary Tract Infection         HPI  Mary Ann Mondragon is a 35 y.o. female  presents with complaint of UTI symptoms. She woke up through the night needing to go to the bathroom and had sone urethral pain.  She has had this before. She feels like she does drink plenty of water, but was out riding in the heat and may not     Review of Systems   Constitutional: Positive for fatigue. Negative for activity change, appetite change, chills, diaphoresis and fever.   HENT: Negative for congestion, rhinorrhea, sinus pressure, sinus pain, sneezing and sore throat.         No reported loss of taste or smell   Respiratory: Negative for chest tightness, shortness of breath and wheezing.    Cardiovascular: Negative for chest pain.   Gastrointestinal: Negative for diarrhea, nausea and vomiting.   Genitourinary: Positive for dysuria, enuresis, frequency and vaginal bleeding. Negative for decreased urine volume, difficulty urinating, dyspareunia, flank pain, genital sores, hematuria, menstrual problem, pelvic pain, urgency, vaginal discharge and vaginal pain.       Past Medical History:   Diagnosis Date   • Abdominal pain, epigastric    • Acquired hypothyroidism    • Acute pharyngitis    • Carbuncle/boil     left axilla   • Contact dermatitis    • Contraception    • Cough    • Dysmenorrhea    • Dysuria    • Encounter for gynecological examination (general) (routine) without abnormal findings    • Epigastric pain    • Fatigue    • GERD (gastroesophageal reflux disease)    • Hashimoto's thyroiditis    • Heartburn    • Increased frequency of urination    • Knee pain    • Multiple joint pain    • Muscle pain    • Nausea    • Neoplasm of ovary    • Pain in wrist     Right wrist.   • Pelvic pain    • Potential Infectious disease contact    • Special examinations and investigations     Gynecological examination   • Surveillance of oral contraception done    • Syncope    •  Upper respiratory infection    • Visit for gynecologic examination    • Vitamin D deficiency    • Vitamin deficiency        Family History   Problem Relation Age of Onset   • Ovarian cancer Other    • Stomach cancer Other    • CHRISTIANA disease Other        Social History     Socioeconomic History   • Marital status: Single     Spouse name: Not on file   • Number of children: Not on file   • Years of education: Not on file   • Highest education level: Not on file   Tobacco Use   • Smoking status: Never Smoker   • Smokeless tobacco: Never Used   Substance and Sexual Activity   • Alcohol use: Yes     Comment: Occasionally   • Drug use: Never   • Sexual activity: Yes     Partners: Male     Birth control/protection: Pill         LMP 07/08/2020     PHYSICAL EXAM  Physical Exam   Constitutional: She is oriented to person, place, and time. She appears well-developed and well-nourished.   HENT:   Head: Normocephalic and atraumatic.   Pulmonary/Chest: Effort normal.  No respiratory distress.  Neurological: She is alert and oriented to person, place, and time.   Skin: Skin is warm and dry.   Psychiatric: She has a normal mood and affect.       Results for orders placed or performed in visit on 06/30/20   Liquid-based Pap Smear, Screening   Result Value Ref Range    Case Report       Gynecologic Cytology Report                       Case: [id number]                                  Authorizing Provider:  Kaylee Osullivan APRN       Collected:           06/30/2020 08:25 AM          Ordering Location:     [facility]     Received:            06/30/2020 08:42 AM                                 [facility]                                                                                  [facility]                                                                     First Screen:          Yudelka Juarez                                                           Specimen:    Sendout to [facility], [facility]                                                                      Interpretation         Mary Ann was seen today for urinary tract infection.    Diagnoses and all orders for this visit:    Lower urinary tract symptoms    Other orders  -     nitrofurantoin, macrocrystal-monohydrate, (MACROBID) 100 MG capsule; Take 1 capsule by mouth 2 (Two) Times a Day for 7 days.  -     phenazopyridine (Pyridium) 200 MG tablet; Take 1 tablet by mouth 3 (Three) Times a Day As Needed for Bladder Spasms for up to 2 days.        **if at any time, experiences fever AND/OR worsening cough AND/OR shortness of breath, has been advised to go to nearest urgent care or emergency department for evaluation AND/OR testing        FOLLOW-UP  with PCP if no improvement or Urgent Care/Emergency Department if worsening of symptoms    Patient verbalizes understanding of medication dosage, comfort measures, instructions for treatment and follow-up.    MYNOR Osei  07/16/2020  5:30 PM    This visit was performed via Telehealth.  This patient has been instructed to follow-up with their primary care provider if their symptoms worsen or the treatment provided does not resolve their illness.    11 minutes

## 2020-07-16 NOTE — PATIENT INSTRUCTIONS
Increase fluids  If symptoms do not improve in 3-5 days follow up with primary care provider or urgent care for urine analysis        Urinary Tract Infection, Adult    A urinary tract infection (UTI) is an infection of any part of the urinary tract. The urinary tract includes the kidneys, ureters, bladder, and urethra. These organs make, store, and get rid of urine in the body.  Your health care provider may use other names to describe the infection. An upper UTI affects the ureters and kidneys (pyelonephritis). A lower UTI affects the bladder (cystitis) and urethra (urethritis).  What are the causes?  Most urinary tract infections are caused by bacteria in your genital area, around the entrance to your urinary tract (urethra). These bacteria grow and cause inflammation of your urinary tract.  What increases the risk?  You are more likely to develop this condition if:  · You have a urinary catheter that stays in place (indwelling).  · You are not able to control when you urinate or have a bowel movement (you have incontinence).  · You are female and you:  ? Use a spermicide or diaphragm for birth control.  ? Have low estrogen levels.  ? Are pregnant.  · You have certain genes that increase your risk (genetics).  · You are sexually active.  · You take antibiotic medicines.  · You have a condition that causes your flow of urine to slow down, such as:  ? An enlarged prostate, if you are male.  ? Blockage in your urethra (stricture).  ? A kidney stone.  ? A nerve condition that affects your bladder control (neurogenic bladder).  ? Not getting enough to drink, or not urinating often.  · You have certain medical conditions, such as:  ? Diabetes.  ? A weak disease-fighting system (immunesystem).  ? Sickle cell disease.  ? Gout.  ? Spinal cord injury.  What are the signs or symptoms?  Symptoms of this condition include:  · Needing to urinate right away (urgently).  · Frequent urination or passing small amounts of urine  frequently.  · Pain or burning with urination.  · Blood in the urine.  · Urine that smells bad or unusual.  · Trouble urinating.  · Cloudy urine.  · Vaginal discharge, if you are female.  · Pain in the abdomen or the lower back.  You may also have:  · Vomiting or a decreased appetite.  · Confusion.  · Irritability or tiredness.  · A fever.  · Diarrhea.  The first symptom in older adults may be confusion. In some cases, they may not have any symptoms until the infection has worsened.  How is this diagnosed?  This condition is diagnosed based on your medical history and a physical exam. You may also have other tests, including:  · Urine tests.  · Blood tests.  · Tests for sexually transmitted infections (STIs).  If you have had more than one UTI, a cystoscopy or imaging studies may be done to determine the cause of the infections.  How is this treated?  Treatment for this condition includes:  · Antibiotic medicine.  · Over-the-counter medicines to treat discomfort.  · Drinking enough water to stay hydrated.  If you have frequent infections or have other conditions such as a kidney stone, you may need to see a health care provider who specializes in the urinary tract (urologist).  In rare cases, urinary tract infections can cause sepsis. Sepsis is a life-threatening condition that occurs when the body responds to an infection. Sepsis is treated in the hospital with IV antibiotics, fluids, and other medicines.  Follow these instructions at home:    Medicines  · Take over-the-counter and prescription medicines only as told by your health care provider.  · If you were prescribed an antibiotic medicine, take it as told by your health care provider. Do not stop using the antibiotic even if you start to feel better.  General instructions  · Make sure you:  ? Empty your bladder often and completely. Do not hold urine for long periods of time.  ? Empty your bladder after sex.  ? Wipe from front to back after a bowel movement  if you are female. Use each tissue one time when you wipe.  · Drink enough fluid to keep your urine pale yellow.  · Keep all follow-up visits as told by your health care provider. This is important.  Contact a health care provider if:  · Your symptoms do not get better after 1-2 days.  · Your symptoms go away and then return.  Get help right away if you have:  · Severe pain in your back or your lower abdomen.  · A fever.  · Nausea or vomiting.  Summary  · A urinary tract infection (UTI) is an infection of any part of the urinary tract, which includes the kidneys, ureters, bladder, and urethra.  · Most urinary tract infections are caused by bacteria in your genital area, around the entrance to your urinary tract (urethra).  · Treatment for this condition often includes antibiotic medicines.  · If you were prescribed an antibiotic medicine, take it as told by your health care provider. Do not stop using the antibiotic even if you start to feel better.  · Keep all follow-up visits as told by your health care provider. This is important.  This information is not intended to replace advice given to you by your health care provider. Make sure you discuss any questions you have with your health care provider.  Document Released: 09/27/2006 Document Revised: 12/05/2019 Document Reviewed: 06/27/2019  [employer] Patient Education © 2020 [employer]

## 2020-07-20 RX ORDER — VALACYCLOVIR HYDROCHLORIDE 1 G/1
1000 TABLET, FILM COATED ORAL 2 TIMES DAILY
Qty: 20 TABLET | Refills: 0 | Status: SHIPPED | OUTPATIENT
Start: 2020-07-20 | End: 2020-08-19 | Stop reason: SDUPTHER

## 2020-08-10 ENCOUNTER — OFFICE VISIT (OUTPATIENT)
Dept: FAMILY MEDICINE CLINIC | Facility: CLINIC | Age: 35
End: 2020-08-10

## 2020-08-10 VITALS
HEART RATE: 87 BPM | SYSTOLIC BLOOD PRESSURE: 112 MMHG | TEMPERATURE: 98.8 F | DIASTOLIC BLOOD PRESSURE: 62 MMHG | HEIGHT: 64 IN | WEIGHT: 110.2 LBS | BODY MASS INDEX: 18.82 KG/M2

## 2020-08-10 DIAGNOSIS — F41.9 ANXIETY: Primary | ICD-10-CM

## 2020-08-10 PROCEDURE — 99214 OFFICE O/P EST MOD 30 MIN: CPT | Performed by: NURSE PRACTITIONER

## 2020-08-10 RX ORDER — LORAZEPAM 0.5 MG/1
0.5 TABLET ORAL 2 TIMES DAILY PRN
Qty: 30 TABLET | Refills: 0 | Status: SHIPPED | OUTPATIENT
Start: 2020-08-10 | End: 2022-05-16 | Stop reason: SDUPTHER

## 2020-08-10 NOTE — PROGRESS NOTES
"Subjective  Answers for HPI/ROS submitted by the patient on 8/10/2020   What is the primary reason for your visit?: Other  Please describe your symptoms.: Wanted to discuss short-term refill of Lorazepam for anxiety.  Have you had these symptoms before?: Yes  How long have you been having these symptoms?: 1-2 weeks  Please describe any probable cause for these symptoms. : Anxiety disorder (diagnosed in 2012, currently seeing therapist)    Mary Ann Mondragon is a 35 y.o. female. 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 She reports she feels she has been managing fairly well during the summer but anxiety has increased with the thought of starting back with in person classes in a few weeks, august 17. She states she is seeing a therapist which is helping but feels she may need additional medication to take prn anxiety. Has used ativan in the past with flying which she tolerated well and worked well for her.     Vitals:    08/10/20 0904   BP: 112/62   Pulse: 87   Temp: 98.8 °F (37.1 °C)   Weight: 50 kg (110 lb 3.2 oz)   Height: 162.6 cm (64\")   PainSc: 0-No pain     Body mass index is 18.92 kg/m².  Past Medical History:   Diagnosis Date   • Abdominal pain, epigastric    • Acquired hypothyroidism    • Acute pharyngitis    • Carbuncle/boil     left axilla   • Contact dermatitis    • Contraception    • Cough    • Dysmenorrhea    • Dysuria    • Encounter for gynecological examination (general) (routine) without abnormal findings    • Epigastric pain    • Fatigue    • GERD (gastroesophageal reflux disease)    • Hashimoto's thyroiditis    • Heartburn    • Increased frequency of urination    • Knee pain    • Multiple joint pain    • Muscle pain    • Nausea    • Neoplasm of ovary    • Pain in wrist     Right wrist. "   • Pelvic pain    • Potential Infectious disease contact    • Special examinations and investigations     Gynecological examination   • Surveillance of oral contraception done    • Syncope    • Upper respiratory infection    • Visit for gynecologic examination    • Vitamin D deficiency    • Vitamin deficiency      Anxiety   Presents for initial visit. Onset was at an unknown time. The problem has been gradually worsening. Symptoms include excessive worry, insomnia, muscle tension, nervous/anxious behavior, panic and restlessness. Patient reports no depressed mood or suicidal ideas. Symptoms occur most days. The severity of symptoms is moderate and interfering with daily activities. The symptoms are aggravated by work stress and specific phobias. Nighttime awakenings: occasional.     There are no known risk factors. Her past medical history is significant for anxiety/panic attacks. Past treatments include lifestyle changes and counseling (CBT). The treatment provided mild relief.        The following portions of the patient's history were reviewed and updated as appropriate: allergies, current medications, past family history, past medical history, past social history, past surgical history and problem list.    Review of Systems   Constitutional: Negative.    HENT: Negative.    Eyes: Negative.    Respiratory: Negative.    Cardiovascular: Negative.    Gastrointestinal: Negative.    Endocrine: Negative.    Genitourinary: Negative.    Musculoskeletal: Negative.    Skin: Negative.    Allergic/Immunologic: Negative.    Neurological: Negative.    Hematological: Negative.    Psychiatric/Behavioral: Positive for sleep disturbance. Negative for self-injury and suicidal ideas. The patient is nervous/anxious and has insomnia.        Objective   Physical Exam   Constitutional: She is oriented to person, place, and time. She appears well-developed and well-nourished. No distress.   Cardiovascular: Normal rate.   Pulmonary/Chest:  Effort normal.   Neurological: She is alert and oriented to person, place, and time.   Skin: Skin is warm and dry. She is not diaphoretic.   Psychiatric: Her speech is normal and behavior is normal. Judgment and thought content normal. Her mood appears anxious. She is not actively hallucinating. Cognition and memory are normal. She does not exhibit a depressed mood.   Pt appears anxious throughout exam, discussing her problems openly, making eye contact, no complaints of suicidal/homicidal ideations. Appears well kempt. Not tearful with exam.  She is attentive.   Nursing note and vitals reviewed.      Assessment/Plan   Mary Ann was seen today for medication problem and anxiety.    Diagnoses and all orders for this visit:    Anxiety  -     LORazepam (ATIVAN) 0.5 MG tablet; Take 1 tablet by mouth 2 (Two) Times a Day As Needed for Anxiety.    dari is obtained and reviewed  She is prescribed ativan prn as above, advised on potential side effects of medicine including potential for addiction  Is informed if she needs antianxiety med long term can consider changing medication to possibly buspar or equivalent for less addictive potential and she is aware and is in agreement to this plan  Continue with counseling  She states she feels once classes begin and covid pandemic risk decreases her anxiety will also decrease  Will reassess need for continued medication in 3 months or sooner prn  All questions and concerns are addressed with understanding noted  She is aware and is in agreement to this plan  DARI query complete. Treatment plan to include limited course of prescribed controlled substance. Risks including addiction, benefits, and alternatives presented to patient.

## 2020-08-20 RX ORDER — VALACYCLOVIR HYDROCHLORIDE 1 G/1
1000 TABLET, FILM COATED ORAL 2 TIMES DAILY
Qty: 20 TABLET | Refills: 0 | Status: SHIPPED | OUTPATIENT
Start: 2020-08-20 | End: 2020-09-20 | Stop reason: SDUPTHER

## 2020-09-01 RX ORDER — NORGESTIMATE AND ETHINYL ESTRADIOL 7DAYSX3 28
KIT ORAL
Qty: 84 TABLET | Refills: 4 | Status: SHIPPED | OUTPATIENT
Start: 2020-09-01 | End: 2021-07-13 | Stop reason: SDUPTHER

## 2020-09-21 RX ORDER — VALACYCLOVIR HYDROCHLORIDE 1 G/1
1000 TABLET, FILM COATED ORAL 2 TIMES DAILY
Qty: 20 TABLET | Refills: 0 | Status: SHIPPED | OUTPATIENT
Start: 2020-09-21 | End: 2020-11-23 | Stop reason: SDUPTHER

## 2020-09-22 RX ORDER — LEVOTHYROXINE AND LIOTHYRONINE 57; 13.5 UG/1; UG/1
45 TABLET ORAL DAILY
Qty: 90 TABLET | Refills: 3 | Status: SHIPPED | OUTPATIENT
Start: 2020-09-22 | End: 2021-06-25 | Stop reason: SDUPTHER

## 2020-11-05 RX ORDER — PROMETHAZINE HYDROCHLORIDE 25 MG/1
25 TABLET ORAL EVERY 6 HOURS PRN
Qty: 30 TABLET | Refills: 0 | Status: SHIPPED | OUTPATIENT
Start: 2020-11-05 | End: 2020-12-17

## 2020-11-24 RX ORDER — VALACYCLOVIR HYDROCHLORIDE 1 G/1
1000 TABLET, FILM COATED ORAL 2 TIMES DAILY
Qty: 20 TABLET | Refills: 0 | Status: SHIPPED | OUTPATIENT
Start: 2020-11-24 | End: 2021-02-15 | Stop reason: SDUPTHER

## 2020-12-17 PROCEDURE — U0003 INFECTIOUS AGENT DETECTION BY NUCLEIC ACID (DNA OR RNA); SEVERE ACUTE RESPIRATORY SYNDROME CORONAVIRUS 2 (SARS-COV-2) (CORONAVIRUS DISEASE [COVID-19]), AMPLIFIED PROBE TECHNIQUE, MAKING USE OF HIGH THROUGHPUT TECHNOLOGIES AS DESCRIBED BY CMS-2020-01-R: HCPCS | Performed by: EMERGENCY MEDICINE

## 2020-12-21 ENCOUNTER — LAB (OUTPATIENT)
Dept: LAB | Facility: OTHER | Age: 35
End: 2020-12-21

## 2020-12-21 ENCOUNTER — OFFICE VISIT (OUTPATIENT)
Dept: FAMILY MEDICINE CLINIC | Facility: CLINIC | Age: 35
End: 2020-12-21

## 2020-12-21 DIAGNOSIS — R05.9 COUGH: Primary | ICD-10-CM

## 2020-12-21 DIAGNOSIS — R53.83 FATIGUE, UNSPECIFIED TYPE: ICD-10-CM

## 2020-12-21 DIAGNOSIS — Z20.822 CLOSE EXPOSURE TO COVID-19 VIRUS: ICD-10-CM

## 2020-12-21 DIAGNOSIS — R50.9 FEVER, UNSPECIFIED FEVER CAUSE: ICD-10-CM

## 2020-12-21 DIAGNOSIS — R09.81 NASAL CONGESTION: ICD-10-CM

## 2020-12-21 DIAGNOSIS — R43.0 LOSS OF SMELL: ICD-10-CM

## 2020-12-21 DIAGNOSIS — R05.9 COUGH: ICD-10-CM

## 2020-12-21 DIAGNOSIS — R51.9 NONINTRACTABLE HEADACHE, UNSPECIFIED CHRONICITY PATTERN, UNSPECIFIED HEADACHE TYPE: ICD-10-CM

## 2020-12-21 PROCEDURE — U0003 INFECTIOUS AGENT DETECTION BY NUCLEIC ACID (DNA OR RNA); SEVERE ACUTE RESPIRATORY SYNDROME CORONAVIRUS 2 (SARS-COV-2) (CORONAVIRUS DISEASE [COVID-19]), AMPLIFIED PROBE TECHNIQUE, MAKING USE OF HIGH THROUGHPUT TECHNOLOGIES AS DESCRIBED BY CMS-2020-01-R: HCPCS | Performed by: NURSE PRACTITIONER

## 2020-12-21 PROCEDURE — 99214 OFFICE O/P EST MOD 30 MIN: CPT | Performed by: NURSE PRACTITIONER

## 2020-12-21 RX ORDER — BROMPHENIRAMINE MALEATE, PSEUDOEPHEDRINE HYDROCHLORIDE, AND DEXTROMETHORPHAN HYDROBROMIDE 2; 30; 10 MG/5ML; MG/5ML; MG/5ML
5 SYRUP ORAL 4 TIMES DAILY PRN
Qty: 118 ML | Refills: 0 | Status: SHIPPED | OUTPATIENT
Start: 2020-12-21 | End: 2021-01-04

## 2020-12-21 NOTE — PROGRESS NOTES
Subjective   Mary Ann Mondragon is a 35 y.o. female. Patient here today with complaints of covid expoosure (patner tested pos thursday), Headache, and Cough  pt here today for telehealth visit reporting that her significant other tested positive last week for Covid.  She started developing symptoms of headache Wednesday night, Thursday she developed cough sore throat nasal congestion and fever has been T-max of 100.  Subsequently developed body aches and fatigue rhinorrhea and watery eyes, denies shortness of breath.  Was tested herself Thursday within 24 hours of symptoms starting at an urgent care in [place] and she tested negative there.  She has been monitoring her pulse ox at home and the lowest that has been has been 97%.  She is not diabetic has no history of lung problems and overall is healthy active 35-year-old female    There were no vitals filed for this visit.  There is no height or weight on file to calculate BMI.  Past Medical History:   Diagnosis Date   • Abdominal pain, epigastric    • Acquired hypothyroidism    • Acute pharyngitis    • Carbuncle/boil     left axilla   • Contact dermatitis    • Contraception    • Cough    • Dysmenorrhea    • Dysuria    • Encounter for gynecological examination (general) (routine) without abnormal findings    • Epigastric pain    • Fatigue    • GERD (gastroesophageal reflux disease)    • Hashimoto's thyroiditis    • Heartburn    • Increased frequency of urination    • Knee pain    • Multiple joint pain    • Muscle pain    • Nausea    • Neoplasm of ovary    • Pain in wrist     Right wrist.   • Pelvic pain    • Potential Infectious disease contact    • Special examinations and investigations     Gynecological examination   • Surveillance of oral contraception done    • Syncope    • Upper respiratory infection    • Visit for gynecologic examination    • Vitamin D deficiency    • Vitamin deficiency      Headache   This is a new problem. The current episode  started in the past 7 days. The problem occurs intermittently. The problem has been waxing and waning. The pain is located in the frontal region. The pain does not radiate. The quality of the pain is described as aching. The pain is moderate. Associated symptoms include coughing, a fever, rhinorrhea, sinus pressure and a sore throat. Nothing aggravates the symptoms. She has tried nothing for the symptoms. The treatment provided no relief. (Exposure to covid )   Cough  This is a new problem. The current episode started in the past 7 days. The problem has been unchanged. The problem occurs every few minutes. Associated symptoms include a fever, headaches, nasal congestion, postnasal drip, rhinorrhea and a sore throat. Pertinent negatives include no shortness of breath. Nothing aggravates the symptoms. She has tried rest for the symptoms. The treatment provided no relief. exposure to covid         The following portions of the patient's history were reviewed and updated as appropriate: allergies, current medications, past family history, past medical history, past social history, past surgical history and problem list.    Review of Systems   Constitutional: Positive for fever.   HENT: Positive for postnasal drip, rhinorrhea, sinus pressure and sore throat.    Eyes: Negative.    Respiratory: Positive for cough. Negative for shortness of breath.    Cardiovascular: Negative.    Gastrointestinal: Negative.    Endocrine: Negative.    Genitourinary: Negative.    Musculoskeletal: Negative.    Skin: Negative.    Allergic/Immunologic: Negative.    Neurological: Positive for headaches.   Hematological: Negative.    Psychiatric/Behavioral: Negative.        Objective   Physical Exam  Deferred, telephone visit   Assessment/Plan   Diagnoses and all orders for this visit:    1. Cough (Primary)  -     COVID-19,[facility] ROUTINE, NP/OP SWAB IN TRANSPORT MEDIA OR ESWAB 72 HR TAT - Swab, Nasopharynx; Future    2. Fever, unspecified fever  cause  -     COVID-19,[facility] ROUTINE, NP/OP SWAB IN TRANSPORT MEDIA OR ESWAB 72 HR TAT - Swab, Nasopharynx; Future    3. Loss of smell  -     COVID-19,[facility] ROUTINE, NP/OP SWAB IN TRANSPORT MEDIA OR ESWAB 72 HR TAT - Swab, Nasopharynx; Future    4. Nasal congestion  -     COVID-19,[facility] ROUTINE, NP/OP SWAB IN TRANSPORT MEDIA OR ESWAB 72 HR TAT - Swab, Nasopharynx; Future    5. Nonintractable headache, unspecified chronicity pattern, unspecified headache type  -     COVID-19,[facility] ROUTINE, NP/OP SWAB IN TRANSPORT MEDIA OR ESWAB 72 HR TAT - Swab, Nasopharynx; Future    6. Fatigue, unspecified type  -     COVID-19,[facility] ROUTINE, NP/OP SWAB IN TRANSPORT MEDIA OR ESWAB 72 HR TAT - Swab, Nasopharynx; Future    7. Close exposure to COVID-19 virus  -     COVID-19,[facility] ROUTINE, NP/OP SWAB IN TRANSPORT MEDIA OR ESWAB 72 HR TAT - Swab, Nasopharynx; Future    Other orders  -     brompheniramine-pseudoephedrine-DM 30-2-10 MG/5ML syrup; Take 5 mL by mouth 4 (Four) Times a Day As Needed for Congestion or Cough.  Dispense: 118 mL; Refill: 0    You have chosen to receive care through a telephone visit. Do you consent to use a telephone visit for your medical care today? Yes  This visit has been rescheduled as a phone visit to comply with patient safety concerns in accordance with CDC recommendations. Total time of discussion was 20 minutes.  Encouraged symptoms to watch for and advised to continue to monitor her temp and pulse ox calling back or going to ER if emergent condition arises  She is prescribed bromfed and is otherwise advised to push fluids, rest, use tylenol/motrin prn fever or pain   Will swab for covid 19 and advised to quarantine until test results back and needs to quarantine until symptom free and fever free x 24 hour or until HD determines   Pt aware and is in agreement to this plan  All questions and concerns are addressed with understanding noted

## 2020-12-23 LAB — SARS-COV-2 RNA RESP QL NAA+PROBE: DETECTED

## 2021-02-15 RX ORDER — VALACYCLOVIR HYDROCHLORIDE 1 G/1
1000 TABLET, FILM COATED ORAL 2 TIMES DAILY
Qty: 20 TABLET | Refills: 0 | Status: SHIPPED | OUTPATIENT
Start: 2021-02-15 | End: 2021-04-13 | Stop reason: SDUPTHER

## 2021-03-22 ENCOUNTER — DOCUMENTATION (OUTPATIENT)
Dept: ENDOCRINOLOGY | Facility: CLINIC | Age: 36
End: 2021-03-22

## 2021-04-10 ENCOUNTER — HOSPITAL ENCOUNTER (EMERGENCY)
Facility: HOSPITAL | Age: 36
Discharge: HOME | End: 2021-04-10
Attending: FAMILY MEDICINE | Admitting: FAMILY MEDICINE

## 2021-04-10 ENCOUNTER — APPOINTMENT (OUTPATIENT)
Dept: ULTRASOUND IMAGING | Facility: HOSPITAL | Age: 36
End: 2021-04-10

## 2021-04-10 VITALS
HEART RATE: 75 BPM | TEMPERATURE: 98.6 F | BODY MASS INDEX: 19.46 KG/M2 | DIASTOLIC BLOOD PRESSURE: 79 MMHG | RESPIRATION RATE: 18 BRPM | HEIGHT: 64 IN | WEIGHT: 114 LBS | OXYGEN SATURATION: 100 % | SYSTOLIC BLOOD PRESSURE: 106 MMHG

## 2021-04-10 DIAGNOSIS — M79.604 PAIN OF RIGHT LOWER EXTREMITY: Primary | ICD-10-CM

## 2021-04-10 PROBLEM — R22.43: Status: ACTIVE | Noted: 2021-04-10

## 2021-04-10 PROCEDURE — 93971 EXTREMITY STUDY: CPT

## 2021-04-10 PROCEDURE — 99282 EMERGENCY DEPT VISIT SF MDM: CPT

## 2021-04-10 NOTE — ED PROVIDER NOTES
Subjective     History provided by:  Patient   used: No    The patient is a 36 years old who went to urgent care today because of pain in the right lower extremity and they sent her to ER to rule out blood clot.  Denies any shortness of breath.  Denies any chest pain.  Denies any fever chills or sweating.    Review of Systems   Musculoskeletal: Positive for joint swelling.   All other systems reviewed and are negative.      Past Medical History:   Diagnosis Date   • Abdominal pain, epigastric    • Acquired hypothyroidism    • Acute pharyngitis    • Carbuncle/boil     left axilla   • Contact dermatitis    • Contraception    • Cough    • Dysmenorrhea    • Dysuria    • Encounter for gynecological examination (general) (routine) without abnormal findings    • Epigastric pain    • Fatigue    • GERD (gastroesophageal reflux disease)    • Hashimoto's thyroiditis    • Heartburn    • Increased frequency of urination    • Knee pain    • Multiple joint pain    • Muscle pain    • Nausea    • Neoplasm of ovary    • Pain in wrist     Right wrist.   • Pelvic pain    • Potential Infectious disease contact    • Special examinations and investigations     Gynecological examination   • Surveillance of oral contraception done    • Syncope    • Upper respiratory infection    • Visit for gynecologic examination    • Vitamin D deficiency    • Vitamin deficiency        Allergies   Allergen Reactions   • Omeprazole Hives     (Prilosec)       Past Surgical History:   Procedure Laterality Date   • ESOPHAGOSCOPY / EGD  06/08/2015    Normal esophagus. Gastritis found in the stomach. Biopsy taken. Normal duodenum.   • TONSILLECTOMY AND ADENOIDECTOMY     • TYMPANOSTOMY TUBE PLACEMENT         Family History   Problem Relation Age of Onset   • Ovarian cancer Other    • Stomach cancer Other    • CHRISTIANA disease Other        Social History     Socioeconomic History   • Marital status: Single     Spouse name: Not on file   • Number of  "children: Not on file   • Years of education: Not on file   • Highest education level: Not on file   Tobacco Use   • Smoking status: Never Smoker   • Smokeless tobacco: Never Used   Substance and Sexual Activity   • Alcohol use: Yes     Comment: Occasionally   • Drug use: Never   • Sexual activity: Yes     Partners: Male     Birth control/protection: Pill         /79 (BP Location: Right arm, Patient Position: Sitting)   Pulse 75   Temp 98.6 °F (37 °C) (Infrared)   Resp 18   Ht 162.6 cm (64\")   Wt 51.7 kg (114 lb)   LMP 03/17/2021 (Within Days)   SpO2 100%   BMI 19.57 kg/m²   Objective   Physical Exam  Vitals and nursing note reviewed.   Constitutional:       Appearance: Normal appearance. She is normal weight.   HENT:      Head: Normocephalic and atraumatic.      Right Ear: Tympanic membrane, ear canal and external ear normal.      Left Ear: Tympanic membrane, ear canal and external ear normal.      Nose: Nose normal.   Eyes:      Extraocular Movements: Extraocular movements intact.      Conjunctiva/sclera: Conjunctivae normal.      Pupils: Pupils are equal, round, and reactive to light.   Cardiovascular:      Rate and Rhythm: Normal rate and regular rhythm.      Pulses: Normal pulses.      Heart sounds: Normal heart sounds.   Pulmonary:      Effort: Pulmonary effort is normal.      Breath sounds: Normal breath sounds.   Abdominal:      General: Abdomen is flat. Bowel sounds are normal.      Palpations: Abdomen is soft.   Musculoskeletal:         General: Swelling present. Normal range of motion.      Cervical back: Normal range of motion and neck supple.   Skin:     General: Skin is warm.      Capillary Refill: Capillary refill takes less than 2 seconds.   Neurological:      General: No focal deficit present.      Mental Status: She is alert and oriented to person, place, and time.         Procedures           ED Course  ED Course as of Apr 10 1428   Sat Apr 10, 2021   1427 Result discussed patient.  " Told to follow-up primary care in 3 days.  Come back to ER symptoms get worse    [MO]      ED Course User Index  [MO] Sin Felton MD            Labs Reviewed - No data to display    US Venous Doppler Lower Extremity Right (duplex)   Final Result      No evidence of deep venous thrombosis within the   femoral-popliteal system of the right lower extremity.      Electronically signed by:  Teresa Rutledge MD  4/10/2021 2:01 PM CDT   Workstation: [id number]                                        [facility]    Final diagnoses:   Pain of right lower extremity       ED Disposition  ED Disposition     ED Disposition Condition Comment    Discharge Stable           Kaylee Osullivan, APRN  [facility] [facility] [address] [place] [id number] [phone]    In 3 days           Medication List      No changes were made to your prescriptions during this visit.          Sin Felton MD  04/11/21 0706

## 2021-04-13 RX ORDER — VALACYCLOVIR HYDROCHLORIDE 1 G/1
1000 TABLET, FILM COATED ORAL 2 TIMES DAILY
Qty: 20 TABLET | Refills: 0 | Status: SHIPPED | OUTPATIENT
Start: 2021-04-13 | End: 2021-10-28 | Stop reason: SDUPTHER

## 2021-04-14 ENCOUNTER — OFFICE VISIT (OUTPATIENT)
Dept: FAMILY MEDICINE CLINIC | Facility: CLINIC | Age: 36
End: 2021-04-14

## 2021-04-14 VITALS
WEIGHT: 115 LBS | HEIGHT: 64 IN | HEART RATE: 87 BPM | SYSTOLIC BLOOD PRESSURE: 110 MMHG | TEMPERATURE: 97.9 F | BODY MASS INDEX: 19.63 KG/M2 | DIASTOLIC BLOOD PRESSURE: 70 MMHG

## 2021-04-14 DIAGNOSIS — M79.604 RIGHT LEG PAIN: ICD-10-CM

## 2021-04-14 DIAGNOSIS — M25.571 ACUTE RIGHT ANKLE PAIN: Primary | ICD-10-CM

## 2021-04-14 PROCEDURE — 99213 OFFICE O/P EST LOW 20 MIN: CPT | Performed by: NURSE PRACTITIONER

## 2021-04-14 NOTE — PROGRESS NOTES
"Chief Complaint  Leg Pain    Subjective          The patient presents today for R lower extremity pain superior to the ankle. She reports on the evening of 04/07/2021, she experienced pain in the right lower extremity superior to the ankle. She denies of a bruise on the area. The patient then pressed on the area, and she felt a nodule; pain was exacerbated by pressure to the area. She denies any known injury. The patient presented to urgent care for evaluation of this on 04/10/2021, and she was sent to the emergency room for an ultrasound. At the emergency room, she was assured that it was not a blood clot, and that \"she has done something to it\". She denies having an x-ray taken at the emergency room. The patient denies pain when bearing weight on the lower extremity today. She adds there has been improvement in pain since 04/07/2021, and she did experience some pain when ambulating and bearing weight before 04/14/2021. Today she rates the pain at 1 out of 10. The patient reports it was mildly swollen, she states the ultrasound tech at the emergency room noticed this, on 04/10/2021. She has been wearing a \"slide on brace\" and icing the area. The patient adds that she took 2 Tylenol PM's the evening of 04/09/2021. The patient reports the night of 04/09/2021, she woke up during the night and it was stiff, and difficult to move, however, it was better on 04/10/2021.     Additionally, the patient had COVID-19 in 12/2020. She adds of a skin tingling sensation that began while she had COVID-19, and has continued since that time. She wonders if this could be a \"weird, lingering effect\" related to COVID-19.     Mary Ann Mondragon presents to [facility]  History of Present Illness    Objective   Vital Signs:   /70   Pulse 87   Temp 97.9 °F (36.6 °C)   Ht 162.6 cm (64\")   Wt 52.2 kg (115 lb)   BMI 19.74 kg/m²     Physical Exam  Vitals and nursing note reviewed. "   Constitutional:       General: She is not in acute distress.     Appearance: Normal appearance. She is normal weight. She is not ill-appearing, toxic-appearing or diaphoretic.   HENT:      Head: Normocephalic and atraumatic.   Cardiovascular:      Rate and Rhythm: Normal rate and regular rhythm.      Pulses: Normal pulses.      Heart sounds: Normal heart sounds. No murmur heard.   No friction rub. No gallop.    Pulmonary:      Effort: Pulmonary effort is normal. No respiratory distress.      Breath sounds: Normal breath sounds. No stridor. No wheezing, rhonchi or rales.   Musculoskeletal:         General: Tenderness present.      Right lower leg: No edema.      Left lower leg: No edema.        Legs:       Comments: Right lower extremity with minimal to no tenderness to the medial aspect and lateral aspect of the right ankle. She is tender to palpation, approximately 1 inch above the lateral malleolus, with some minimal swelling and warmth to this areas as well. DP and PT pulses are palpable.  She has ability to flex, extend, invert, and justen with full range of motion and no pain. She is ambulatory without assistance, gait is not guarded.    Skin:     General: Skin is warm and dry.   Neurological:      Mental Status: She is alert and oriented to person, place, and time.   Psychiatric:         Mood and Affect: Mood normal.         Behavior: Behavior normal.         Thought Content: Thought content normal.         Judgment: Judgment normal.        Result Review :       \plain          XR Tibia Fibula 2 View Right (04/14/2021 08:58)  XR Ankle 3+ View Right (04/14/2021 08:58)       Assessment and Plan    Diagnoses and all orders for this visit:    1. Acute right ankle pain (Primary)  -     XR Ankle 3+ View Right  -     XR Tibia Fibula 2 View Right    2. Right leg pain  -     XR Ankle 3+ View Right  -     XR Tibia Fibula 2 View Right        I will send her for a  right ankle right tib-fib x-rays. She is advised to  continue rice therapy, and if her symptoms should persist or worsen, she will call back for consideration of MRI/orthopedic referral. The patient is advised to wear supportive shoes, especially in the next 2 weeks until this hopefully resolves by itself.  Patient aware and in agreement to this plan.  All questions and concerns addressed with understanding verbalized.    I spent 25 minutes caring for Mary Ann on this date of service. This time includes time spent by me in the following activities:preparing for the visit, reviewing tests, performing a medically appropriate examination and/or evaluation , counseling and educating the patient/family/caregiver, ordering medications, tests, or procedures and documenting information in the medical record  Follow Up   Return if symptoms worsen or fail to improve, for Recheck, or sooner as needed.  Patient was given instructions and counseling regarding her condition or for health maintenance advice. Please see specific information pulled into the AVS if appropriate.       Scribed for MYNOR Mcnulty by Marivel Mondragon.  04/14/21   09:22 CDT    I have personally performed the services described in this document as scribed by the above individual, and it is both accurate and complete.  MYNOR Mcnulty  4/14/2021  12:48 CDT

## 2021-06-23 ENCOUNTER — LAB (OUTPATIENT)
Dept: LAB | Facility: HOSPITAL | Age: 36
End: 2021-06-23

## 2021-06-23 PROCEDURE — 36415 COLL VENOUS BLD VENIPUNCTURE: CPT | Performed by: INTERNAL MEDICINE

## 2021-06-23 PROCEDURE — 83516 IMMUNOASSAY NONANTIBODY: CPT | Performed by: INTERNAL MEDICINE

## 2021-06-23 PROCEDURE — 85025 COMPLETE CBC W/AUTO DIFF WBC: CPT | Performed by: INTERNAL MEDICINE

## 2021-06-23 PROCEDURE — 82784 ASSAY IGA/IGD/IGG/IGM EACH: CPT | Performed by: INTERNAL MEDICINE

## 2021-06-23 PROCEDURE — 82607 VITAMIN B-12: CPT | Performed by: INTERNAL MEDICINE

## 2021-06-23 PROCEDURE — 84443 ASSAY THYROID STIM HORMONE: CPT | Performed by: INTERNAL MEDICINE

## 2021-06-23 PROCEDURE — 82306 VITAMIN D 25 HYDROXY: CPT | Performed by: INTERNAL MEDICINE

## 2021-06-23 PROCEDURE — 80061 LIPID PANEL: CPT | Performed by: INTERNAL MEDICINE

## 2021-06-23 PROCEDURE — 80053 COMPREHEN METABOLIC PANEL: CPT | Performed by: INTERNAL MEDICINE

## 2021-06-24 LAB
25(OH)D3 SERPL-MCNC: 39.1 NG/ML (ref 30–100)
ALBUMIN SERPL-MCNC: 4.3 G/DL (ref 3.5–5.2)
ALBUMIN/GLOB SERPL: 1.5 G/DL
ALP SERPL-CCNC: 46 U/L (ref 39–117)
ALT SERPL W P-5'-P-CCNC: 13 U/L (ref 1–33)
ANION GAP SERPL CALCULATED.3IONS-SCNC: 11.5 MMOL/L (ref 5–15)
AST SERPL-CCNC: 24 U/L (ref 1–32)
BASOPHILS # BLD AUTO: 0.03 10*3/MM3 (ref 0–0.2)
BASOPHILS NFR BLD AUTO: 0.4 % (ref 0–1.5)
BILIRUB SERPL-MCNC: 0.3 MG/DL (ref 0–1.2)
BUN SERPL-MCNC: 14 MG/DL (ref 6–20)
BUN/CREAT SERPL: 19.2 (ref 7–25)
CALCIUM SPEC-SCNC: 8.8 MG/DL (ref 8.6–10.5)
CHLORIDE SERPL-SCNC: 102 MMOL/L (ref 98–107)
CHOLEST SERPL-MCNC: 191 MG/DL (ref 0–200)
CO2 SERPL-SCNC: 23.5 MMOL/L (ref 22–29)
CREAT SERPL-MCNC: 0.73 MG/DL (ref 0.57–1)
DEPRECATED RDW RBC AUTO: 43.7 FL (ref 37–54)
EOSINOPHIL # BLD AUTO: 0.16 10*3/MM3 (ref 0–0.4)
EOSINOPHIL NFR BLD AUTO: 2.2 % (ref 0.3–6.2)
ERYTHROCYTE [DISTWIDTH] IN BLOOD BY AUTOMATED COUNT: 12.5 % (ref 12.3–15.4)
GFR SERPL CREATININE-BSD FRML MDRD: 89 ML/MIN/1.73
GLIADIN PEPTIDE IGA SER-ACNC: 6 UNITS (ref 0–19)
GLOBULIN UR ELPH-MCNC: 2.9 GM/DL
GLUCOSE SERPL-MCNC: 80 MG/DL (ref 65–99)
HCT VFR BLD AUTO: 41.1 % (ref 34–46.6)
HDLC SERPL-MCNC: 70 MG/DL (ref 40–60)
HGB BLD-MCNC: 13.6 G/DL (ref 12–15.9)
IGA SERPL-MCNC: 215 MG/DL (ref 87–352)
IMM GRANULOCYTES # BLD AUTO: 0.02 10*3/MM3 (ref 0–0.05)
IMM GRANULOCYTES NFR BLD AUTO: 0.3 % (ref 0–0.5)
LDLC SERPL CALC-MCNC: 104 MG/DL (ref 0–100)
LDLC/HDLC SERPL: 1.46 {RATIO}
LYMPHOCYTES # BLD AUTO: 1.96 10*3/MM3 (ref 0.7–3.1)
LYMPHOCYTES NFR BLD AUTO: 26.4 % (ref 19.6–45.3)
MCH RBC QN AUTO: 30.9 PG (ref 26.6–33)
MCHC RBC AUTO-ENTMCNC: 33.1 G/DL (ref 31.5–35.7)
MCV RBC AUTO: 93.4 FL (ref 79–97)
MONOCYTES # BLD AUTO: 0.46 10*3/MM3 (ref 0.1–0.9)
MONOCYTES NFR BLD AUTO: 6.2 % (ref 5–12)
NEUTROPHILS NFR BLD AUTO: 4.8 10*3/MM3 (ref 1.7–7)
NEUTROPHILS NFR BLD AUTO: 64.5 % (ref 42.7–76)
NRBC BLD AUTO-RTO: 0.1 /100 WBC (ref 0–0.2)
PLATELET # BLD AUTO: 180 10*3/MM3 (ref 140–450)
PMV BLD AUTO: 12.8 FL (ref 6–12)
POTASSIUM SERPL-SCNC: 4.4 MMOL/L (ref 3.5–5.2)
PROT SERPL-MCNC: 7.2 G/DL (ref 6–8.5)
RBC # BLD AUTO: 4.4 10*6/MM3 (ref 3.77–5.28)
SODIUM SERPL-SCNC: 137 MMOL/L (ref 136–145)
TRIGL SERPL-MCNC: 93 MG/DL (ref 0–150)
TSH SERPL DL<=0.05 MIU/L-ACNC: 3.15 UIU/ML (ref 0.27–4.2)
TTG IGA SER-ACNC: <2 U/ML (ref 0–3)
VIT B12 BLD-MCNC: 375 PG/ML (ref 211–946)
VLDLC SERPL-MCNC: 17 MG/DL (ref 5–40)
WBC # BLD AUTO: 7.43 10*3/MM3 (ref 3.4–10.8)

## 2021-06-25 ENCOUNTER — OFFICE VISIT (OUTPATIENT)
Dept: ENDOCRINOLOGY | Facility: CLINIC | Age: 36
End: 2021-06-25

## 2021-06-25 VITALS
OXYGEN SATURATION: 98 % | WEIGHT: 114 LBS | SYSTOLIC BLOOD PRESSURE: 100 MMHG | BODY MASS INDEX: 19.57 KG/M2 | HEART RATE: 76 BPM | DIASTOLIC BLOOD PRESSURE: 60 MMHG

## 2021-06-25 DIAGNOSIS — E78.5 DYSLIPIDEMIA: ICD-10-CM

## 2021-06-25 DIAGNOSIS — E06.3 HYPOTHYROIDISM DUE TO HASHIMOTO'S THYROIDITIS: Primary | ICD-10-CM

## 2021-06-25 DIAGNOSIS — K90.0 CELIAC DISEASE: ICD-10-CM

## 2021-06-25 DIAGNOSIS — E03.8 HYPOTHYROIDISM DUE TO HASHIMOTO'S THYROIDITIS: Primary | ICD-10-CM

## 2021-06-25 DIAGNOSIS — E55.9 VITAMIN D DEFICIENCY: ICD-10-CM

## 2021-06-25 PROCEDURE — 99214 OFFICE O/P EST MOD 30 MIN: CPT | Performed by: INTERNAL MEDICINE

## 2021-06-25 RX ORDER — LEVOCETIRIZINE DIHYDROCHLORIDE 5 MG/1
5 TABLET, FILM COATED ORAL EVERY EVENING
COMMUNITY
End: 2021-10-12

## 2021-06-25 RX ORDER — LEVOTHYROXINE AND LIOTHYRONINE 57; 13.5 UG/1; UG/1
45 TABLET ORAL DAILY
Qty: 90 TABLET | Refills: 3 | Status: SHIPPED | OUTPATIENT
Start: 2021-06-25 | End: 2022-05-05

## 2021-06-25 NOTE — PROGRESS NOTES
Subjective    Mary Ann Mondragon is a 36 y.o. female. she is here today for follow-up.    Hashimoto's Thyroiditis  Pertinent negatives include no abdominal pain, arthralgias, chest pain, coughing, diaphoresis, fatigue, headaches, joint swelling, myalgias, nausea, neck pain, numbness, rash, sore throat, vomiting or weakness.          Primary Care Provider     Kaylee Osullivan, APRBLADIMIR     37 yo comes for fu    Hypothyroidism,     Constant,   controlled    , mild severity       Lab Results   Component Value Date    TSH 3.150 06/23/2021           She is compliant      Bone health       Taking vitamin d          PE    /60   Pulse 76   Wt 51.7 kg (114 lb)   SpO2 98%   BMI 19.57 kg/m²   AOx3  No visible goiter  RRR  CTA  No Edema    Labs    Lab Results   Component Value Date    WBC 7.43 06/23/2021    HGB 13.6 06/23/2021    HCT 41.1 06/23/2021    MCV 93.4 06/23/2021     06/23/2021     Lab Results   Component Value Date    GLUCOSE 80 06/23/2021    BUN 14 06/23/2021    CREATININE 0.73 06/23/2021    EGFRIFNONA 89 06/23/2021    BCR 19.2 06/23/2021    K 4.4 06/23/2021    CO2 23.5 06/23/2021    CALCIUM 8.8 06/23/2021    ALBUMIN 4.30 06/23/2021    AST 24 06/23/2021    ALT 13 06/23/2021         TSH   Date Value Ref Range Status   06/23/2021 3.150 0.270 - 4.200 uIU/mL Final     Free T4   Date Value Ref Range Status   05/18/2018 0.64 (L) 0.78 - 2.19 ng/dL Final     T3, Free   Date Value Ref Range Status   05/18/2018 3.4 2.0 - 4.4 pg/mL Final       Assessment/Plan      1. Hypothyroidism due to Hashimoto's thyroiditis    2. Vitamin D deficiency    3. Celiac disease    4. Dyslipidemia      Bone Health     Lab Results   Component Value Date    SEEU33ZK 39.1 06/23/2021       Taking MVI daily- continue        Thyroid Health         Lab Results   Component Value Date    TSH 3.150 06/23/2021              armour 90 mg ,taking half a tab         Other Diabetes Related Aspects      Lab Results   Component Value Date     [id number] 375 06/23/2021                   Last celiac panel            Lab Results   Component Value Date     GDPABIGA 6 05/09/2016     TTRANSGLIGA <2 05/09/2016      05/09/2016       mpv , ELEVATED, REASSURED       4. No follow-ups on file.

## 2021-07-13 ENCOUNTER — LAB (OUTPATIENT)
Dept: LAB | Facility: OTHER | Age: 36
End: 2021-07-13

## 2021-07-13 ENCOUNTER — OFFICE VISIT (OUTPATIENT)
Dept: FAMILY MEDICINE CLINIC | Facility: CLINIC | Age: 36
End: 2021-07-13

## 2021-07-13 VITALS
DIASTOLIC BLOOD PRESSURE: 62 MMHG | HEIGHT: 64 IN | SYSTOLIC BLOOD PRESSURE: 110 MMHG | WEIGHT: 118.6 LBS | TEMPERATURE: 98 F | BODY MASS INDEX: 20.25 KG/M2 | HEART RATE: 80 BPM

## 2021-07-13 DIAGNOSIS — N76.0 ACUTE VAGINITIS: ICD-10-CM

## 2021-07-13 DIAGNOSIS — Z12.4 PAP SMEAR FOR CERVICAL CANCER SCREENING: ICD-10-CM

## 2021-07-13 DIAGNOSIS — Z01.419 WELL WOMAN EXAM WITH ROUTINE GYNECOLOGICAL EXAM: Primary | ICD-10-CM

## 2021-07-13 DIAGNOSIS — Z30.41 ENCOUNTER FOR SURVEILLANCE OF CONTRACEPTIVE PILLS: ICD-10-CM

## 2021-07-13 DIAGNOSIS — Z80.41 FAMILY HISTORY OF OVARIAN CANCER: ICD-10-CM

## 2021-07-13 PROCEDURE — 99395 PREV VISIT EST AGE 18-39: CPT | Performed by: NURSE PRACTITIONER

## 2021-07-13 RX ORDER — NORGESTIMATE AND ETHINYL ESTRADIOL 7DAYSX3 28
1 KIT ORAL DAILY
Qty: 84 TABLET | Refills: 4 | Status: SHIPPED | OUTPATIENT
Start: 2021-07-13

## 2021-07-13 RX ORDER — RANITIDINE HCL 75 MG
75 TABLET ORAL 2 TIMES DAILY
COMMUNITY
End: 2021-10-12

## 2021-07-13 NOTE — PROGRESS NOTES
"Chief Complaint  Gynecologic Exam    Subjective          The patient presents today for her well woman examination and Pap smear test. She denies breast tenderness, vaginal itching, vaginal discharge, or pain with intercourse. The patient is taking an oral contraceptive and she does need refills of this today.     Additionally, she reports that she had an abnormal Pap smear test result approximately 2-years ago. The patient adds she had a follow-up Pap smear test and this resulted in normal findings. She notes that her mother was diagnosed with ovarian cancer at 55-years-old. The patient had a pelvic ultrasound approximately 5-years ago due to her family history, as well as patient report of pain at that time; ultrasound findings were of normal findings. She has had her COVID-19 vaccination. The patient notes that she had a tetanus vaccine in 2017.     She is [id number]. Has family hx of ovarian cancer in mother,  at age 60 yo. . Menarche at age 11. Menses regular, on OCPs. Does report she does SBEs monthly. Denies family hx of colon cancer, uterine cancer, breast cancer. She is single, a , drinks 1-2 alcoholic beverages per week, denies tobacco use. Denies illicit drug use.     Mary Ann Mondragon presents to [facility]  History of Present Illness    Objective   Vital Signs:   /62   Pulse 80   Temp 98 °F (36.7 °C)   Ht 162.6 cm (64\")   Wt 53.8 kg (118 lb 9.6 oz)   BMI 20.36 kg/m²     Physical Exam  Vitals and nursing note reviewed.   Constitutional:       General: She is not in acute distress.     Appearance: Normal appearance. She is well-developed and normal weight. She is not ill-appearing, toxic-appearing or diaphoretic.   HENT:      Head: Normocephalic and atraumatic.      Nose: Nose normal.   Eyes:      General:         Right eye: No discharge.         Left eye: No discharge.      Conjunctiva/sclera: Conjunctivae normal.   Neck:    "   Thyroid: No thyromegaly.      Vascular: No carotid bruit.   Cardiovascular:      Rate and Rhythm: Normal rate and regular rhythm.      Pulses: Normal pulses.      Heart sounds: Normal heart sounds. No murmur heard.   No friction rub. No gallop.    Pulmonary:      Effort: Pulmonary effort is normal. No respiratory distress.      Breath sounds: Normal breath sounds. No stridor. No wheezing, rhonchi or rales.   Abdominal:      General: Bowel sounds are normal. There is no distension.      Palpations: Abdomen is soft. There is no mass.      Tenderness: There is no abdominal tenderness. There is no guarding or rebound.      Hernia: No hernia is present. There is no hernia in the left inguinal area or right inguinal area.   Genitourinary:     Exam position: Supine.      Pubic Area: No pubic lice.       Labia:         Right: Lesion (appears to be bug bites ) present. No rash, tenderness or injury.         Left: Lesion (appears to be bug bites ) present. No rash, tenderness or injury.       Vagina: No signs of injury and foreign body. Vaginal discharge present. No erythema, tenderness, bleeding or prolapsed vaginal walls.      Cervix: Friability and erythema present. No cervical motion tenderness or discharge.      Uterus: Not deviated, not enlarged, not fixed, not tender and no uterine prolapse.       Adnexa:         Right: No mass, tenderness or fullness.          Left: No mass, tenderness or fullness.            Comments: External genitalia, she has a few erythematous, slightly pruritic lesions which she thinks are insect bites from sitting in the grass working in her yard. These are noted to the external genitalia and inner thighs, approximately 5 in number.  Mildly discomforting  with insertion of speculum. The cervix is slightly friable. There is a moderate amount of some discharge that is somewhat yellow in color, not malodorous, that is noted.   Bimanual examination is unremarkable.  Rectal examination deferred.     Musculoskeletal:         General: No deformity. Normal range of motion.      Cervical back: Normal range of motion and neck supple.      Right lower leg: No edema.      Left lower leg: No edema.   Skin:     General: Skin is warm and dry.      Coloration: Skin is not jaundiced or pale.      Findings: No bruising, erythema, lesion or rash.   Neurological:      Mental Status: She is alert and oriented to person, place, and time.      Cranial Nerves: No cranial nerve deficit.      Motor: No weakness or abnormal muscle tone.      Coordination: Coordination normal.      Gait: Gait normal.      Deep Tendon Reflexes: Reflexes are normal and symmetric. Reflexes normal.   Psychiatric:         Mood and Affect: Mood normal.         Behavior: Behavior normal.         Thought Content: Thought content normal.         Judgment: Judgment normal.        Result Review :                 Assessment and Plan    Diagnoses and all orders for this visit:    1. Well woman exam with routine gynecological exam (Primary)    2. Pap smear for cervical cancer screening  -     Liquid-based Pap Smear, Screening    3. Family history of ovarian cancer  -     US Pelvis Complete    4. Acute vaginitis    5. Encounter for surveillance of contraceptive pills    Other orders  -     norgestimate-ethinyl estradiol (Tri-Sprintec) 0.18/0.215/0.25 MG-35 MCG per tablet; Take 1 tablet by mouth Daily.  Dispense: 84 tablet; Refill: 4      She is given on OCPs x 1-year. I will order pelvic ultrasound as she does have a family history of ovarian cancer in mother. She will return in  1 year for her next Pap smear test, or sooner depending on her Pap smear test results.     All questions and concerns are addressed with understanding noted. The patient is in agreement to above plan.    I spent 33 minutes caring for Mary Ann on this date of service. This time includes time spent by me in the following activities:preparing for the visit, performing a medically  appropriate examination and/or evaluation , counseling and educating the patient/family/caregiver, ordering medications, tests, or procedures and documenting information in the medical record  Follow Up   Return in about 1 year (around 7/13/2022), or if symptoms worsen or fail to improve, for Recheck, or sooner as needed.  Patient was given instructions and counseling regarding her condition or for health maintenance advice. Please see specific information pulled into the AVS if appropriate.     Scribed for MYNOR Mcnulty by Marivel Mondragon.  07/13/21   10:11 CDT    I have personally performed the services described in this document as scribed by the above individual, and it is both accurate and complete.  MYNOR Mcnulty  7/13/2021  12:03 CDT

## 2021-07-15 LAB
LAB AP CASE REPORT: NORMAL
PATH INTERP SPEC-IMP: NORMAL

## 2021-08-02 DIAGNOSIS — R93.89 ABNORMAL ULTRASOUND: Primary | ICD-10-CM

## 2021-08-10 ENCOUNTER — TELEMEDICINE (OUTPATIENT)
Dept: FAMILY MEDICINE CLINIC | Facility: CLINIC | Age: 36
End: 2021-08-10

## 2021-08-10 DIAGNOSIS — Z80.41 FAMILY HISTORY OF OVARIAN CANCER: ICD-10-CM

## 2021-08-10 DIAGNOSIS — R93.89 ABNORMAL PELVIC ULTRASOUND: Primary | ICD-10-CM

## 2021-08-10 PROCEDURE — 99213 OFFICE O/P EST LOW 20 MIN: CPT | Performed by: NURSE PRACTITIONER

## 2021-08-10 NOTE — PROGRESS NOTES
Chief Complaint  Follow-up    Subjective          Mary Ann Mondragon presents to [facility] PRIMARY [facility]ARE  History of Present Illness  Pt here today for video visit to discuss findings from seeing her ob/gyn last Friday. She was ordered to have a pelvic US at last visit here due to mother having had ovarian cancer and mild pelvic pain on exam. US was abnormal and she was referred on to gyn. She saw dr nicanor knapp last Friday and pt states that she was advised to move forward with hyst with BSO due to above. She states she just has some questions regarding this. She was already informed that she would require HRT after, she would likely have robotically therefore recovery time would likely be much less and that she could wait until her [employer] break from [facility] to have this done. She states she is leaning toward having this surgery but just wants some confirmation of her decision today. She states she does not desire pregnancy.     Objective   Vital Signs:   There were no vitals taken for this visit.    Physical Exam  Constitutional:       General: She is not in acute distress.     Appearance: Normal appearance. She is not ill-appearing, toxic-appearing or diaphoretic.   Pulmonary:      Effort: Pulmonary effort is normal.   Neurological:      Mental Status: She is alert and oriented to person, place, and time.   Psychiatric:         Mood and Affect: Mood normal.         Behavior: Behavior normal.         Thought Content: Thought content normal.         Judgment: Judgment normal.        Result Review :     Common labs    Common Labsle 6/23/21 6/23/21 6/23/21    0745 0745 0745   Glucose 80     BUN 14     Creatinine 0.73     eGFR Non African Am 89     Sodium 137     Potassium 4.4     Chloride 102     Calcium 8.8     Albumin 4.30     Total Bilirubin 0.3     Alkaline Phosphatase 46     AST (SGOT) 24     ALT (SGPT) 13     WBC   7.43   Hemoglobin   13.6   Hematocrit   41.1   Platelets    180   Total Cholesterol  191    Triglycerides  93    HDL Cholesterol  70 (A)    LDL Cholesterol   104 (A)    (A) Abnormal value            Data reviewed: Radiologic studies pelvic US results reviewed today           Assessment and Plan    Diagnoses and all orders for this visit:    1. Abnormal pelvic ultrasound (Primary)    2. Family history of ovarian cancer  Comments:  mother        I spent 33 minutes caring for Mary Ann on this date of service. This time includes time spent by me in the following activities:preparing for the visit, reviewing tests, performing a medically appropriate examination and/or evaluation , counseling and educating the patient/family/caregiver and documenting information in the medical record     All questions and concerns are addressed with her today. Apprehension appears to improve regarding impending surgery. As we finish visit pt indicates to me that she is planning on going through with surgery with dr knapp, [facility] , and will call her office to schedule. Pt aware and in agreement to above plan.   Follow Up   Return if symptoms worsen or fail to improve, for or sooner as needed, Recheck.  Patient was given instructions and counseling regarding her condition or for health maintenance advice. Please see specific information pulled into the AVS if appropriate.     You have chosen to receive care through a telehealth visit.  Do you consent to use a video/audio connection for your medical care today? Yes    Study Result    Narrative & Impression   EXAM: US PELVIS     INDICATION: family history ovarian cancer, mother, Z80.41 Family  history of malignant neoplasm of ovary     COMPARISONS: Ultrasound 6/23/2017     TECHNIQUE: Grayscale and color Doppler ultrasound images were  obtained of the pelvis per departmental protocol.     FINDINGS:   The uterus is anteverted, demonstrating normal  echotexture and  measures 7.3 x 3.3 x 4.1 cm.       The endometrial stripe is normal in appearance  and measures 4 mm.        The right ovary demonstrates normal size and morphology with  appropriate follicular patterns. The right ovary measures 2.1 x  1.6 x 2.2 cm.     There is a simple left ovarian cyst that measures 2.9 x 2.6 x 2.4  cm, no further follow-up required by criteria. The left ovarian  tissue is diminutive.      Prominent tubular anechoic and hypoechoic structure arising from  the uterus into the left adnexa.     There are no adnexal masses visualized. There is no free fluid.     IMPRESSION:  Left hydrosalpinx. Follow-up as clinically indicated.     Simple left ovarian cyst, no further follow-up by criteria and  absence of new or worsening symptomatology.     Electronically signed by:  Celestino Jones MD  7/28/2021  11:46 AM CDT Workstation: [id number]

## 2021-10-12 ENCOUNTER — OFFICE VISIT (OUTPATIENT)
Dept: FAMILY MEDICINE CLINIC | Facility: CLINIC | Age: 36
End: 2021-10-12

## 2021-10-12 VITALS
BODY MASS INDEX: 19.94 KG/M2 | DIASTOLIC BLOOD PRESSURE: 72 MMHG | SYSTOLIC BLOOD PRESSURE: 102 MMHG | TEMPERATURE: 98.4 F | OXYGEN SATURATION: 100 % | HEIGHT: 64 IN | WEIGHT: 116.8 LBS | HEART RATE: 78 BPM

## 2021-10-12 DIAGNOSIS — R10.9 CHRONIC ABDOMINAL PAIN: Primary | ICD-10-CM

## 2021-10-12 DIAGNOSIS — G89.29 CHRONIC ABDOMINAL PAIN: Primary | ICD-10-CM

## 2021-10-12 DIAGNOSIS — R14.0 ABDOMINAL BLOATING: ICD-10-CM

## 2021-10-12 DIAGNOSIS — R10.84 GENERALIZED ABDOMINAL PAIN: ICD-10-CM

## 2021-10-12 DIAGNOSIS — R19.7 DIARRHEA, UNSPECIFIED TYPE: ICD-10-CM

## 2021-10-12 PROCEDURE — 99213 OFFICE O/P EST LOW 20 MIN: CPT | Performed by: NURSE PRACTITIONER

## 2021-10-12 RX ORDER — FLUTICASONE PROPIONATE 50 MCG
2 SPRAY, SUSPENSION (ML) NASAL DAILY
COMMUNITY

## 2021-10-12 RX ORDER — FAMOTIDINE 20 MG/1
TABLET, FILM COATED ORAL
COMMUNITY
Start: 2021-09-01

## 2021-10-12 NOTE — PROGRESS NOTES
Answers for HPI/ROS submitted by the patient on 10/12/2021  What is the primary reason for your visit?: Abdominal Pain    Chief Complaint  Personal Problem (Needing referrral to a GI specialist)    Subjective          Mary Ann Mondragon presents to [facility]  Abdominal Pain  This is a chronic problem. The current episode started more than 1 year ago. The onset quality is gradual. The problem occurs every several days. The most recent episode lasted 2 days. The problem has been waxing and waning. The pain is located in the generalized abdominal region. The pain is at a severity of 4/10. The quality of the pain is aching and a sensation of fullness. The abdominal pain radiates to the LLQ, RLQ, epigastric region, periumbilical region and suprapubic region. Associated symptoms include belching, constipation, diarrhea, flatus, nausea and weight loss. Pertinent negatives include no anorexia, arthralgias, dysuria, fever, frequency, headaches, hematochezia, hematuria, melena, myalgias or vomiting. The pain is aggravated by drinking alcohol and eating. The pain is relieved by nothing, belching and bowel movements.     The patient presents today for a referral to gastroenterology.     She reports that she does not have to have a hysterectomy after an ultrasound of her abdomen confirmed it was a varicose vein, and not fluid.     She adds that her digestive symptoms have progressively worsened. The patient adds that once or twice per month she will have a flare-up of her symptoms that lasts about 1 day, and then resolves. She adds that when she will eat cabbage, broccoli, brussels sprouts, or grains that are high in fiber, and approximately 4 to 6 hours later she will have painful bloating that makes her feel sick. Sometimes these episodes are followed by diarrhea. The patient adds that belching seems to help. She denies vomiting; however, she will feel nauseous.  "    The patient notes that her father, and 2 of her aunts have similar symptoms. She denies a family history of celiac disease or Crohn's. Additionally, she reports that she has a hard time gaining weight, but denies weight loss.      Objective   Vital Signs:   /72   Pulse 78   Temp 98.4 °F (36.9 °C)   Ht 162.6 cm (64\")   Wt 53 kg (116 lb 12.8 oz)   SpO2 100%   BMI 20.05 kg/m²     Physical Exam  Vitals and nursing note reviewed.   Constitutional:       General: She is not in acute distress.     Appearance: Normal appearance. She is normal weight. She is not ill-appearing, toxic-appearing or diaphoretic.   HENT:      Head: Normocephalic and atraumatic.   Cardiovascular:      Pulses: Normal pulses.      Heart sounds: Normal heart sounds. No murmur heard.  No friction rub. No gallop.    Pulmonary:      Effort: Pulmonary effort is normal. No respiratory distress.      Breath sounds: Normal breath sounds. No stridor. No wheezing, rhonchi or rales.   Abdominal:      General: Abdomen is flat. Bowel sounds are normal. There is no distension.      Palpations: Abdomen is soft. There is no mass.      Tenderness: There is generalized abdominal tenderness (mild ). There is no guarding.   Skin:     General: Skin is warm and dry.   Neurological:      Mental Status: She is alert and oriented to person, place, and time.      Cranial Nerves: No cranial nerve deficit.      Motor: No weakness.      Coordination: Coordination normal.      Gait: Gait normal.   Psychiatric:         Mood and Affect: Mood normal.         Behavior: Behavior normal.         Thought Content: Thought content normal.         Judgment: Judgment normal.     Answers for HPI/ROS submitted by the patient on 10/12/2021  What is the primary reason for your visit?: Abdominal Pain       Result Review :     Common labs    Common Labsle 6/23/21 6/23/21 6/23/21    0745 0745 0745   Glucose 80     BUN 14     Creatinine 0.73     eGFR Non African Am 89     Sodium " 137     Potassium 4.4     Chloride 102     Calcium 8.8     Albumin 4.30     Total Bilirubin 0.3     Alkaline Phosphatase 46     AST (SGOT) 24     ALT (SGPT) 13     WBC   7.43   Hemoglobin   13.6   Hematocrit   41.1   Platelets   180   Total Cholesterol  191    Triglycerides  93    HDL Cholesterol  70 (A)    LDL Cholesterol   104 (A)    (A) Abnormal value                      Assessment and Plan    Diagnoses and all orders for this visit:    1. Chronic abdominal pain (Primary)  -     Ambulatory Referral to Gastroenterology    2. Abdominal bloating  Comments:  after eating, intermittently , worse after eating vegetables ruy onion  Orders:  -     Ambulatory Referral to Gastroenterology    3. Diarrhea, unspecified type  Comments:  intermittently   Orders:  -     Ambulatory Referral to Gastroenterology    4. Generalized abdominal pain  Comments:  worse after eating, dad and two aunts with same complaints   Orders:  -     Ambulatory Referral to Gastroenterology    We will refer her on to gastroenterology, Dr. Landen Liz, per her request for further evaluation, and treatment as he deems necessary. She will follow-up here as needed for problems or as scheduled for chronic conditions otherwise. All questions, and concerns are addressed with understanding noted. The patient is in agreement to above plan.    I spent 33 minutes caring for Mary Ann on this date of service. This time includes time spent by me in the following activities:preparing for the visit, performing a medically appropriate examination and/or evaluation , counseling and educating the patient/family/caregiver, referring and communicating with other health care professionals  and documenting information in the medical record  Follow Up   Return if symptoms worsen or fail to improve, for Recheck, or sooner as needed.  Patient was given instructions and counseling regarding her condition or for health maintenance advice. Please see specific information  pulled into the AVS if appropriate.     Transcribed from ambient dictation for MYNOR Mcnulty by Moriah Heard.  10/12/21   12:18 CDT    I have personally performed the services described in this document as transcribed by the above individual, and it is both accurate and complete.  MYNOR Mcnulty  10/12/2021  12:24 CDT

## 2021-10-28 RX ORDER — VALACYCLOVIR HYDROCHLORIDE 1 G/1
1000 TABLET, FILM COATED ORAL 2 TIMES DAILY
Qty: 20 TABLET | Refills: 0 | Status: SHIPPED | OUTPATIENT
Start: 2021-10-28 | End: 2022-01-20 | Stop reason: SDUPTHER

## 2022-01-20 RX ORDER — VALACYCLOVIR HYDROCHLORIDE 1 G/1
1000 TABLET, FILM COATED ORAL 2 TIMES DAILY
Qty: 20 TABLET | Refills: 0 | Status: SHIPPED | OUTPATIENT
Start: 2022-01-20 | End: 2022-04-27 | Stop reason: SDUPTHER

## 2022-03-07 ENCOUNTER — OFFICE VISIT (OUTPATIENT)
Dept: FAMILY MEDICINE CLINIC | Facility: CLINIC | Age: 37
End: 2022-03-07

## 2022-03-07 VITALS
HEART RATE: 84 BPM | HEIGHT: 64 IN | BODY MASS INDEX: 20.93 KG/M2 | DIASTOLIC BLOOD PRESSURE: 70 MMHG | SYSTOLIC BLOOD PRESSURE: 112 MMHG | OXYGEN SATURATION: 99 % | WEIGHT: 122.6 LBS

## 2022-03-07 DIAGNOSIS — K21.9 GASTROESOPHAGEAL REFLUX DISEASE, UNSPECIFIED WHETHER ESOPHAGITIS PRESENT: Primary | Chronic | ICD-10-CM

## 2022-03-07 PROCEDURE — 99213 OFFICE O/P EST LOW 20 MIN: CPT | Performed by: NURSE PRACTITIONER

## 2022-03-07 RX ORDER — PANTOPRAZOLE SODIUM 40 MG/1
TABLET, DELAYED RELEASE ORAL
COMMUNITY
Start: 2022-03-04

## 2022-03-07 NOTE — PROGRESS NOTES
"Answers for HPI/ROS submitted by the patient on 3/3/2022  Please describe your symptoms.: Went to [place] in November to see digestive specialist; diagnosed with IBS and given Rx pantoprazole, which has been working. Wondering if I can move the Rx to Mrs. Osullivan to save travel time to [facility].  Have you had these symptoms before?: Yes  How long have you been having these symptoms?: Greater than 2 weeks  Please list any medications you are currently taking for this condition.: pantoprazole  What is the primary reason for your visit?: Other    Chief Complaint  Irritable Bowel Syndrome (Follow up )    Subjective          Mary Ann Mondragon presents to [facility]  History of Present Illness  Pt here today for recheck of GERD, states she did see GI in [place], [place], dr mendez last fall and was changed from nexium to protonix. Reports this is working well for her, she does not need this refilled at present but is wondering if she can start obtaining this medicine from here vs having to go to [place] for this follow up and for refills on protonix. States she can take pepcid additionally if needed but has not needed that recently. Denies nausea, vomiting, changes in bowels. States colonoscopy UTD, neg from 2003. Records from GI reviewed.     Objective   Vital Signs:   /70   Pulse 84   Ht 162.6 cm (64\")   Wt 55.6 kg (122 lb 9.6 oz)   SpO2 99%   BMI 21.04 kg/m²     Physical Exam  Vitals and nursing note reviewed.   Constitutional:       General: She is not in acute distress.     Appearance: Normal appearance. She is normal weight. She is not ill-appearing, toxic-appearing or diaphoretic.      Comments: Up 6 pounds since last visit here, bmi 21   HENT:      Head: Normocephalic and atraumatic.   Neck:      Vascular: No carotid bruit.   Cardiovascular:      Rate and Rhythm: Normal rate and regular rhythm.      Heart sounds: Normal heart sounds. No murmur " heard.    No friction rub. No gallop.   Pulmonary:      Effort: Pulmonary effort is normal. No respiratory distress.      Breath sounds: Normal breath sounds. No stridor. No wheezing, rhonchi or rales.   Abdominal:      General: Bowel sounds are normal. There is no distension.      Palpations: Abdomen is soft. There is no mass.      Tenderness: There is no abdominal tenderness. There is no guarding.      Hernia: No hernia is present.   Musculoskeletal:      Right lower leg: No edema.      Left lower leg: No edema.   Skin:     General: Skin is warm and dry.      Coloration: Skin is not jaundiced or pale.      Findings: No bruising, erythema, lesion or rash.   Neurological:      Mental Status: She is alert and oriented to person, place, and time.      Cranial Nerves: No cranial nerve deficit.      Motor: No weakness.      Coordination: Coordination normal.      Gait: Gait normal.   Psychiatric:         Mood and Affect: Mood normal.         Behavior: Behavior normal.         Thought Content: Thought content normal.         Judgment: Judgment normal.        Result Review :     Common labs    Common Labsle 6/23/21 6/23/21 6/23/21    0745 0745 0745   Glucose 80     BUN 14     Creatinine 0.73     eGFR Non African Am 89     Sodium 137     Potassium 4.4     Chloride 102     Calcium 8.8     Albumin 4.30     Total Bilirubin 0.3     Alkaline Phosphatase 46     AST (SGOT) 24     ALT (SGPT) 13     WBC   7.43   Hemoglobin   13.6   Hematocrit   41.1   Platelets   180   Total Cholesterol  191    Triglycerides  93    HDL Cholesterol  70 (A)    LDL Cholesterol   104 (A)    (A) Abnormal value            Data reviewed: Consultant notes GI 's note reviewed from , dr mendez, [place], [place]          Assessment and Plan    Diagnoses and all orders for this visit:    1. Gastroesophageal reflux disease, unspecified whether esophagitis present (Primary)  Comments:  currently controlled on protonix    she does not need refills at this time  on protonix but when she does she will call back and let us know. Will follow up in 6 months or sooner if needed. Can continue to add pepcid prn. Not due for mammogram yet. All questions and concerns addressed with understanding noted. She is aware and is in agreement to this plan.     I spent 33 minutes caring for Mary Ann on this date of service. This time includes time spent by me in the following activities:preparing for the visit, performing a medically appropriate examination and/or evaluation , counseling and educating the patient/family/caregiver, ordering medications, tests, or procedures and documenting information in the medical record  Follow Up   Return in about 6 months (around 9/7/2022), or if symptoms worsen or fail to improve, for Recheck, or sooner as needed.  Patient was given instructions and counseling regarding her condition or for health maintenance advice. Please see specific information pulled into the AVS if appropriate.

## 2022-04-27 RX ORDER — VALACYCLOVIR HYDROCHLORIDE 1 G/1
1000 TABLET, FILM COATED ORAL 2 TIMES DAILY
Qty: 20 TABLET | Refills: 0 | Status: SHIPPED | OUTPATIENT
Start: 2022-04-27 | End: 2022-05-16 | Stop reason: SDUPTHER

## 2022-05-05 RX ORDER — LEVOTHYROXINE AND LIOTHYRONINE 57; 13.5 UG/1; UG/1
TABLET ORAL
Qty: 90 TABLET | Refills: 3 | Status: SHIPPED | OUTPATIENT
Start: 2022-05-05 | End: 2022-05-10 | Stop reason: SDUPTHER

## 2022-05-10 RX ORDER — LEVOTHYROXINE AND LIOTHYRONINE 57; 13.5 UG/1; UG/1
TABLET ORAL
Qty: 90 TABLET | Refills: 3 | Status: SHIPPED | OUTPATIENT
Start: 2022-05-10

## 2022-05-16 ENCOUNTER — OFFICE VISIT (OUTPATIENT)
Dept: FAMILY MEDICINE CLINIC | Facility: CLINIC | Age: 37
End: 2022-05-16

## 2022-05-16 VITALS
HEART RATE: 86 BPM | HEIGHT: 64 IN | BODY MASS INDEX: 21.17 KG/M2 | WEIGHT: 124 LBS | DIASTOLIC BLOOD PRESSURE: 68 MMHG | SYSTOLIC BLOOD PRESSURE: 102 MMHG | OXYGEN SATURATION: 99 %

## 2022-05-16 DIAGNOSIS — R11.0 NAUSEA: ICD-10-CM

## 2022-05-16 DIAGNOSIS — K21.9 GASTROESOPHAGEAL REFLUX DISEASE, UNSPECIFIED WHETHER ESOPHAGITIS PRESENT: Chronic | ICD-10-CM

## 2022-05-16 DIAGNOSIS — F40.243 FEAR OF FLYING: Primary | ICD-10-CM

## 2022-05-16 DIAGNOSIS — F41.9 ANXIETY: ICD-10-CM

## 2022-05-16 PROCEDURE — 99213 OFFICE O/P EST LOW 20 MIN: CPT | Performed by: NURSE PRACTITIONER

## 2022-05-16 RX ORDER — VALACYCLOVIR HYDROCHLORIDE 1 G/1
1000 TABLET, FILM COATED ORAL 2 TIMES DAILY
Qty: 20 TABLET | Refills: 0 | Status: SHIPPED | OUTPATIENT
Start: 2022-05-16 | End: 2022-08-12 | Stop reason: SDUPTHER

## 2022-05-16 RX ORDER — PROMETHAZINE HYDROCHLORIDE 25 MG/1
25 TABLET ORAL EVERY 6 HOURS PRN
Qty: 20 TABLET | Refills: 0 | Status: SHIPPED | OUTPATIENT
Start: 2022-05-16 | End: 2022-12-13 | Stop reason: SDUPTHER

## 2022-05-16 RX ORDER — LORAZEPAM 0.5 MG/1
0.5 TABLET ORAL 2 TIMES DAILY PRN
Qty: 10 TABLET | Refills: 0 | Status: SHIPPED | OUTPATIENT
Start: 2022-05-16 | End: 2022-12-13 | Stop reason: SDUPTHER

## 2022-05-16 NOTE — PROGRESS NOTES
"Chief Complaint  Anxiety (Nervous about flying on upcoming trip/)    Subjective          Mary Ann Mondragon presents to [facility]  History of Present Illness    The patient presents to the clinic for a follow-up on her anxiety.    She reports that she has a trip to [place] coming up, which involves a plane. She states that she would like to get the Ativan refilled. The patient reports that she has taken Ativan in the past, and it went well. The patient reports that she is flying out of [facility] to [facility] and then straight from [facility] to [facility]. She states that she believes the way over it is approximately 7 hours, and the flight back is approximately 8 hours. The patient reports that she went to [place], [place] for work and took Ativan it approximately .5 hours before her flight then. She states that she did not need another Ativan because it made her somewhat groggy, but not overly groggy The patient reports that she is leaving on 05/28/2022, and she will come back on 06/07/2022.     She requests a refill of Phenergan in case she has any motion sickness.     The patient states that her refill of her Protonix is due in the earlier portion of 06/2022 and she is unsure if she would be able to get it refilled prior to her trip. She states that she does not want stomach issues and adds Protonix has been working well.     She notes that she needs a refill of Valtrex as well, reports is working well.    The following portions of the patient's history were reviewed and updated as appropriate: allergies, current medications, past family history, past medical history, past social history, past surgical history, and problem list.    Objective   Vital Signs:  /68   Pulse 86   Ht 162.6 cm (64\")   Wt 56.2 kg (124 lb)   SpO2 99%   BMI 21.28 kg/m²     BMI is within normal parameters. No follow-up required.      Physical Exam  Vitals and " nursing note reviewed.   Constitutional:       General: She is not in acute distress.     Appearance: Normal appearance. She is not ill-appearing, toxic-appearing or diaphoretic.   HENT:      Head: Normocephalic and atraumatic.   Cardiovascular:      Rate and Rhythm: Normal rate.   Pulmonary:      Effort: Pulmonary effort is normal.   Skin:     General: Skin is warm and dry.      Coloration: Skin is not jaundiced or pale.      Findings: No bruising, erythema, lesion or rash.   Neurological:      Mental Status: She is alert and oriented to person, place, and time.   Psychiatric:         Attention and Perception: Attention normal.         Mood and Affect: Mood is anxious. Affect is not tearful.         Speech: Speech normal.         Behavior: Behavior normal.         Thought Content: Thought content normal.         Cognition and Memory: Cognition normal.         Judgment: Judgment normal.      Comments: Psychosocial: She discusses her problems openly, and appears anxious during examination. No suicidal or homicidal ideations today. No signs of depression, making eye contact, appears well-kempt.        Result Review :                 Assessment and Plan    Diagnoses and all orders for this visit:    1. Fear of flying (Primary)    2. Anxiety  -     LORazepam (ATIVAN) 0.5 MG tablet; Take 1 tablet by mouth 2 (Two) Times a Day As Needed for Anxiety.  Dispense: 10 tablet; Refill: 0    3. Gastroesophageal reflux disease, unspecified whether esophagitis present  Comments:  controlled    4. Nausea    Other orders  -     promethazine (PHENERGAN) 25 MG tablet; Take 1 tablet by mouth Every 6 (Six) Hours As Needed for Nausea or Vomiting.  Dispense: 20 tablet; Refill: 0  -     valACYclovir (Valtrex) 1000 MG tablet; Take 1 tablet by mouth 2 (Two) Times a Day.  Dispense: 20 tablet; Refill: 0    DARI is obtained and reviewed. She is given Ativan with instructions on how to use it prior to and during flight. I will refill Valtrex and  Phenergan for her. These have worked well for her in the past and she just needs refills on Phenergan, which she is needing in case she was to get sick on her trip. She will follow up here as needed for problems, otherwise, I will see her back as scheduled for chronic conditions. All questions and concerns are addressed with understanding noted. The patient is in agreement to above plan.       I spent 33 minutes caring for Mary Ann on this date of service. This time includes time spent by me in the following activities:preparing for the visit, obtaining and/or reviewing a separately obtained history, performing a medically appropriate examination and/or evaluation , counseling and educating the patient/family/caregiver, ordering medications, tests, or procedures and documenting information in the medical record  Follow Up   Return if symptoms worsen or fail to improve, for Recheck, or sooner as needed.  Patient was given instructions and counseling regarding her condition or for health maintenance advice. Please see specific information pulled into the AVS if appropriate.     Transcribed from ambient dictation for MYNOR Mcnulty by Fatou Zepeda.  05/16/22   09:55 CDT    Patient verbalized consent to the visit recording.  I have personally performed the services described in this document as transcribed by the above individual, and it is both accurate and complete.  MYNOR Mcnulty  5/16/2022  12:18 CDT      Answers for HPI/ROS submitted by the patient on 5/10/2022  Please describe your symptoms.: Taking a trip at the end of May and need anxiety medicine for the flight  Have you had these symptoms before?: Yes  How long have you been having these symptoms?: Greater than 2 weeks  What is the primary reason for your visit?: Other

## 2022-06-27 ENCOUNTER — TELEPHONE (OUTPATIENT)
Dept: ENDOCRINOLOGY | Facility: CLINIC | Age: 37
End: 2022-06-27

## 2022-06-27 DIAGNOSIS — K90.0 CELIAC DISEASE: Primary | ICD-10-CM

## 2022-06-27 DIAGNOSIS — E03.8 HYPOTHYROIDISM DUE TO HASHIMOTO'S THYROIDITIS: ICD-10-CM

## 2022-06-27 DIAGNOSIS — E06.3 HYPOTHYROIDISM DUE TO HASHIMOTO'S THYROIDITIS: ICD-10-CM

## 2022-06-27 DIAGNOSIS — E55.9 VITAMIN D DEFICIENCY: ICD-10-CM

## 2022-06-27 DIAGNOSIS — E53.8 B12 DEFICIENCY: ICD-10-CM

## 2022-06-29 ENCOUNTER — LAB (OUTPATIENT)
Dept: LAB | Facility: HOSPITAL | Age: 37
End: 2022-06-29

## 2022-06-29 DIAGNOSIS — E53.8 B12 DEFICIENCY: ICD-10-CM

## 2022-06-29 DIAGNOSIS — E03.8 HYPOTHYROIDISM DUE TO HASHIMOTO'S THYROIDITIS: ICD-10-CM

## 2022-06-29 DIAGNOSIS — E06.3 HYPOTHYROIDISM DUE TO HASHIMOTO'S THYROIDITIS: ICD-10-CM

## 2022-06-29 DIAGNOSIS — E55.9 VITAMIN D DEFICIENCY: ICD-10-CM

## 2022-06-29 DIAGNOSIS — K90.0 CELIAC DISEASE: ICD-10-CM

## 2022-06-29 PROCEDURE — 86258 DGP ANTIBODY EACH IG CLASS: CPT

## 2022-06-29 PROCEDURE — 36415 COLL VENOUS BLD VENIPUNCTURE: CPT

## 2022-06-29 PROCEDURE — 86364 TISS TRNSGLTMNASE EA IG CLAS: CPT

## 2022-06-29 PROCEDURE — 80050 GENERAL HEALTH PANEL: CPT

## 2022-06-29 PROCEDURE — 82784 ASSAY IGA/IGD/IGG/IGM EACH: CPT

## 2022-06-29 PROCEDURE — 82306 VITAMIN D 25 HYDROXY: CPT

## 2022-06-29 PROCEDURE — 82607 VITAMIN B-12: CPT

## 2022-06-30 LAB
25(OH)D3 SERPL-MCNC: 43.2 NG/ML (ref 30–100)
ALBUMIN SERPL-MCNC: 4.4 G/DL (ref 3.5–5.2)
ALBUMIN/GLOB SERPL: 1.5 G/DL
ALP SERPL-CCNC: 45 U/L (ref 39–117)
ALT SERPL W P-5'-P-CCNC: 9 U/L (ref 1–33)
ANION GAP SERPL CALCULATED.3IONS-SCNC: 12 MMOL/L (ref 5–15)
AST SERPL-CCNC: 17 U/L (ref 1–32)
BASOPHILS # BLD AUTO: 0.03 10*3/MM3 (ref 0–0.2)
BASOPHILS NFR BLD AUTO: 0.4 % (ref 0–1.5)
BILIRUB SERPL-MCNC: 0.4 MG/DL (ref 0–1.2)
BUN SERPL-MCNC: 12 MG/DL (ref 6–20)
BUN/CREAT SERPL: 17.9 (ref 7–25)
CALCIUM SPEC-SCNC: 9.1 MG/DL (ref 8.6–10.5)
CHLORIDE SERPL-SCNC: 103 MMOL/L (ref 98–107)
CO2 SERPL-SCNC: 22 MMOL/L (ref 22–29)
CREAT SERPL-MCNC: 0.67 MG/DL (ref 0.57–1)
DEPRECATED RDW RBC AUTO: 40.7 FL (ref 37–54)
EGFRCR SERPLBLD CKD-EPI 2021: 114.2 ML/MIN/1.73
EOSINOPHIL # BLD AUTO: 0.09 10*3/MM3 (ref 0–0.4)
EOSINOPHIL NFR BLD AUTO: 1.3 % (ref 0.3–6.2)
ERYTHROCYTE [DISTWIDTH] IN BLOOD BY AUTOMATED COUNT: 12.2 % (ref 12.3–15.4)
GLIADIN PEPTIDE IGA SER-ACNC: 6 UNITS (ref 0–19)
GLOBULIN UR ELPH-MCNC: 2.9 GM/DL
GLUCOSE SERPL-MCNC: 80 MG/DL (ref 65–99)
HCT VFR BLD AUTO: 40 % (ref 34–46.6)
HGB BLD-MCNC: 13.3 G/DL (ref 12–15.9)
IGA SERPL-MCNC: 220 MG/DL (ref 87–352)
IMM GRANULOCYTES # BLD AUTO: 0.02 10*3/MM3 (ref 0–0.05)
IMM GRANULOCYTES NFR BLD AUTO: 0.3 % (ref 0–0.5)
LYMPHOCYTES # BLD AUTO: 1.81 10*3/MM3 (ref 0.7–3.1)
LYMPHOCYTES NFR BLD AUTO: 26.2 % (ref 19.6–45.3)
MCH RBC QN AUTO: 30.6 PG (ref 26.6–33)
MCHC RBC AUTO-ENTMCNC: 33.3 G/DL (ref 31.5–35.7)
MCV RBC AUTO: 92 FL (ref 79–97)
MONOCYTES # BLD AUTO: 0.47 10*3/MM3 (ref 0.1–0.9)
MONOCYTES NFR BLD AUTO: 6.8 % (ref 5–12)
NEUTROPHILS NFR BLD AUTO: 4.48 10*3/MM3 (ref 1.7–7)
NEUTROPHILS NFR BLD AUTO: 65 % (ref 42.7–76)
NRBC BLD AUTO-RTO: 0 /100 WBC (ref 0–0.2)
PLATELET # BLD AUTO: 194 10*3/MM3 (ref 140–450)
PMV BLD AUTO: 13 FL (ref 6–12)
POTASSIUM SERPL-SCNC: 4.2 MMOL/L (ref 3.5–5.2)
PROT SERPL-MCNC: 7.3 G/DL (ref 6–8.5)
RBC # BLD AUTO: 4.35 10*6/MM3 (ref 3.77–5.28)
SODIUM SERPL-SCNC: 137 MMOL/L (ref 136–145)
TSH SERPL DL<=0.05 MIU/L-ACNC: 3.06 UIU/ML (ref 0.27–4.2)
TTG IGA SER-ACNC: <2 U/ML (ref 0–3)
VIT B12 BLD-MCNC: 303 PG/ML (ref 211–946)
WBC NRBC COR # BLD: 6.9 10*3/MM3 (ref 3.4–10.8)

## 2022-07-01 ENCOUNTER — OFFICE VISIT (OUTPATIENT)
Dept: ENDOCRINOLOGY | Facility: CLINIC | Age: 37
End: 2022-07-01

## 2022-07-01 VITALS
HEART RATE: 66 BPM | BODY MASS INDEX: 20.83 KG/M2 | SYSTOLIC BLOOD PRESSURE: 98 MMHG | DIASTOLIC BLOOD PRESSURE: 60 MMHG | OXYGEN SATURATION: 99 % | WEIGHT: 122 LBS | HEIGHT: 64 IN

## 2022-07-01 DIAGNOSIS — E55.9 VITAMIN D DEFICIENCY: ICD-10-CM

## 2022-07-01 DIAGNOSIS — E03.8 HYPOTHYROIDISM DUE TO HASHIMOTO'S THYROIDITIS: Primary | ICD-10-CM

## 2022-07-01 DIAGNOSIS — E53.8 B12 DEFICIENCY: ICD-10-CM

## 2022-07-01 DIAGNOSIS — E06.3 HYPOTHYROIDISM DUE TO HASHIMOTO'S THYROIDITIS: Primary | ICD-10-CM

## 2022-07-01 PROCEDURE — 99214 OFFICE O/P EST MOD 30 MIN: CPT | Performed by: INTERNAL MEDICINE

## 2022-07-01 NOTE — PROGRESS NOTES
"  Subjective    Mary Ann Mondragon is a 37 y.o. female. she is here today for follow-up.    Hashimoto's Thyroiditis  Pertinent negatives include no abdominal pain, arthralgias, chest pain, coughing, diaphoresis, fatigue, headaches, joint swelling, myalgias, nausea, neck pain, numbness, rash, sore throat, vomiting or weakness.   Hypothyroidism  Pertinent negatives include no abdominal pain, arthralgias, chest pain, coughing, diaphoresis, fatigue, headaches, joint swelling, myalgias, nausea, neck pain, numbness, rash, sore throat, vomiting or weakness.          Primary Care Provider     Kaylee Osullivan, APRN     35 yo comes for fu    Hypothyroidism,     Constant,   controlled    , mild severity       Lab Results   Component Value Date    TSH 3.060 06/29/2022           She is compliant      Bone health       Taking vitamin d          PE    BP 98/60   Pulse 66   Ht 162.6 cm (64\")   Wt 55.3 kg (122 lb)   SpO2 99%   BMI 20.94 kg/m²   AOx3  No visible goiter  RRR  CTA  No Edema    Labs    Lab Results   Component Value Date    WBC 6.90 06/29/2022    HGB 13.3 06/29/2022    HCT 40.0 06/29/2022    MCV 92.0 06/29/2022     06/29/2022     Lab Results   Component Value Date    GLUCOSE 80 06/29/2022    BUN 12 06/29/2022    CREATININE 0.67 06/29/2022    EGFRIFNONA 89 06/23/2021    BCR 17.9 06/29/2022    K 4.2 06/29/2022    CO2 22.0 06/29/2022    CALCIUM 9.1 06/29/2022    ALBUMIN 4.40 06/29/2022    AST 17 06/29/2022    ALT 9 06/29/2022         TSH   Date Value Ref Range Status   06/29/2022 3.060 0.270 - 4.200 uIU/mL Final     Free T4   Date Value Ref Range Status   05/18/2018 0.64 (L) 0.78 - 2.19 ng/dL Final     T3, Free   Date Value Ref Range Status   05/18/2018 3.4 2.0 - 4.4 pg/mL Final       Assessment & Plan      1. Hypothyroidism due to Hashimoto's thyroiditis    2. Vitamin D deficiency    3. B12 deficiency      Bone Health     Lab Results   Component Value Date    ISIB24JY 43.2 06/29/2022       Taking MVI " daily- continue        Thyroid Health         Lab Results   Component Value Date    TSH 3.060 06/29/2022              armour 90 mg ,taking half a tab         Other Diabetes Related Aspects      Lab Results   Component Value Date    GWCNGDND51 303 06/29/2022                   Last celiac panel            Lab Results   Component Value Date     GDPABIGA 6 05/09/2016     TTRANSGLIGA <2 05/09/2016      05/09/2016       mpv , ELEVATED, REASSURED       4. No follow-ups on file.

## 2022-08-15 RX ORDER — VALACYCLOVIR HYDROCHLORIDE 1 G/1
1000 TABLET, FILM COATED ORAL 2 TIMES DAILY
Qty: 20 TABLET | Refills: 0 | Status: SHIPPED | OUTPATIENT
Start: 2022-08-15 | End: 2022-11-12 | Stop reason: SDUPTHER

## 2022-09-19 RX ORDER — NORGESTIMATE AND ETHINYL ESTRADIOL 7DAYSX3 28
KIT ORAL
Qty: 84 TABLET | Refills: 4 | OUTPATIENT
Start: 2022-09-19

## 2022-09-20 DIAGNOSIS — Z12.31 ENCOUNTER FOR SCREENING MAMMOGRAM FOR MALIGNANT NEOPLASM OF BREAST: Primary | ICD-10-CM

## 2022-11-14 RX ORDER — VALACYCLOVIR HYDROCHLORIDE 1 G/1
1000 TABLET, FILM COATED ORAL 2 TIMES DAILY
Qty: 20 TABLET | Refills: 0 | Status: SHIPPED | OUTPATIENT
Start: 2022-11-14 | End: 2023-02-15 | Stop reason: SDUPTHER

## 2022-12-13 ENCOUNTER — OFFICE VISIT (OUTPATIENT)
Dept: FAMILY MEDICINE CLINIC | Facility: CLINIC | Age: 37
End: 2022-12-13

## 2022-12-13 VITALS
BODY MASS INDEX: 22.16 KG/M2 | OXYGEN SATURATION: 100 % | WEIGHT: 129.8 LBS | HEIGHT: 64 IN | TEMPERATURE: 97 F | HEART RATE: 77 BPM | DIASTOLIC BLOOD PRESSURE: 62 MMHG | SYSTOLIC BLOOD PRESSURE: 106 MMHG

## 2022-12-13 DIAGNOSIS — T75.3XXS MOTION SICKNESS, SEQUELA: ICD-10-CM

## 2022-12-13 DIAGNOSIS — F41.9 ANXIETY: ICD-10-CM

## 2022-12-13 DIAGNOSIS — F40.243 FEAR OF FLYING: Primary | ICD-10-CM

## 2022-12-13 DIAGNOSIS — B00.9 HSV INFECTION: Chronic | ICD-10-CM

## 2022-12-13 PROCEDURE — 99214 OFFICE O/P EST MOD 30 MIN: CPT | Performed by: NURSE PRACTITIONER

## 2022-12-13 RX ORDER — LIDOCAINE 50 MG/G
1 OINTMENT TOPICAL
Qty: 50 G | Refills: 2 | Status: SHIPPED | OUTPATIENT
Start: 2022-12-13

## 2022-12-13 RX ORDER — LORAZEPAM 0.5 MG/1
0.5 TABLET ORAL 2 TIMES DAILY PRN
Qty: 10 TABLET | Refills: 0 | Status: SHIPPED | OUTPATIENT
Start: 2022-12-13

## 2022-12-13 RX ORDER — PROMETHAZINE HYDROCHLORIDE 25 MG/1
25 TABLET ORAL EVERY 6 HOURS PRN
Qty: 20 TABLET | Refills: 0 | Status: SHIPPED | OUTPATIENT
Start: 2022-12-13

## 2022-12-13 NOTE — PROGRESS NOTES
"Chief Complaint  Fear of flying    Subjective        Mary Ann Mondragon presents to [facility]     History of Present Illness   The patient presents today for anxiety and fear of flying.     She reports that she will be traveling to [place] for [employer]. She states that on her previous flight in 05/2022 she was medicated and it helped to take the \"edge off\" in regards to her anxiety. She adds that she was prescribed Ativan 0.5 mg. She adds that she took 2 doses of Ativan during her entire flight.     She states that she would also like to be prescribed Phenergan to assist with her motion sickness.     She reports that she would like her lidocaine ointment for herpes simplex virus refilled.     Answers for HPI/ROS submitted by the patient on 12/11/2022  Please describe your symptoms.: Needing anti-anxiety medicarion for upcoming plane ride.  Have you had these symptoms before?: Yes  How long have you been having these symptoms?: Greater than 2 weeks  What is the primary reason for your visit?: Other    Objective   Vital Signs:  /62   Pulse 77   Temp 97 °F (36.1 °C)   Ht 162.6 cm (64\")   Wt 58.9 kg (129 lb 12.8 oz)   SpO2 100%   BMI 22.28 kg/m²   Estimated body mass index is 22.28 kg/m² as calculated from the following:    Height as of this encounter: 162.6 cm (64\").    Weight as of this encounter: 58.9 kg (129 lb 12.8 oz).    BMI is within normal parameters. No other follow-up for BMI required.    Physical Exam  Vitals and nursing note reviewed.   Constitutional:       General: She is not in acute distress.     Appearance: Normal appearance. She is normal weight. She is not ill-appearing, toxic-appearing or diaphoretic.   HENT:      Head: Normocephalic and atraumatic.   Neck:      Vascular: No carotid bruit.   Cardiovascular:      Rate and Rhythm: Normal rate and regular rhythm.      Heart sounds: Normal heart sounds. No murmur heard.    No " friction rub. No gallop.   Pulmonary:      Effort: Pulmonary effort is normal. No respiratory distress.      Breath sounds: Normal breath sounds. No stridor. No wheezing, rhonchi or rales.   Skin:     General: Skin is warm and dry.      Coloration: Skin is not jaundiced or pale.      Findings: No bruising, erythema, lesion or rash.   Neurological:      Mental Status: She is alert and oriented to person, place, and time.      Cranial Nerves: No cranial nerve deficit.      Motor: No weakness.      Coordination: Coordination normal.      Gait: Gait normal.   Psychiatric:         Attention and Perception: Attention normal.         Mood and Affect: Mood is anxious.         Speech: Speech normal.         Behavior: Behavior normal. Behavior is cooperative.         Thought Content: Thought content normal.         Cognition and Memory: Cognition normal.         Judgment: Judgment normal.      Comments: She does appear anxious throughout examination.  Appears well kempt, discusses her problems openly.        Result Review :                Assessment and Plan   Diagnoses and all orders for this visit:    1. Fear of flying (Primary)  -     LORazepam (ATIVAN) 0.5 MG tablet; Take 1 tablet by mouth 2 (Two) Times a Day As Needed for Anxiety.  Dispense: 10 tablet; Refill: 0    2. Anxiety  -     LORazepam (ATIVAN) 0.5 MG tablet; Take 1 tablet by mouth 2 (Two) Times a Day As Needed for Anxiety.  Dispense: 10 tablet; Refill: 0    3. HSV infection  -     lidocaine (XYLOCAINE) 5 % ointment; Apply 1 application topically to the appropriate area as directed Every 2 (Two) Hours As Needed for Mild Pain.  Dispense: 50 g; Refill: 2    4. Motion sickness, sequela  -     promethazine (PHENERGAN) 25 MG tablet; Take 1 tablet by mouth Every 6 (Six) Hours As Needed for Nausea or Vomiting.  Dispense: 20 tablet; Refill: 0    DARI was obtained and reviewed. Ativan is refilled for her for preflight and during flight if needed and she is aware of how  to take this and she is aware of potential side effects of medication. She is also given refills on lidocaine ointment that she uses periodically for HSV infection, but does not report any current outbreak. She is also given a refill on Phenergan today that she can use as needed for motion sickness such as during the flight as well. She will follow up here as needed for problems or as scheduled for chronic conditions. All questions and concerns are addressed with understanding verbalized. The patient is aware and in agreement to this plan.    I spent 30 minutes caring for Mary Ann on this date of service. This time includes time spent by me in the following activities: preparing for the visit, reviewing tests, performing a medically appropriate examination and/or evaluation, counseling and educating the patient/family/caregiver, ordering medications, tests, or procedures and documenting information in the medical record.     I spent 30 minutes caring for Mary Ann on this date of service. This time includes time spent by me in the following activities:preparing for the visit, obtaining and/or reviewing a separately obtained history, performing a medically appropriate examination and/or evaluation , counseling and educating the patient/family/caregiver, ordering medications, tests, or procedures and documenting information in the medical record  Follow Up   Return if symptoms worsen or fail to improve, for Recheck, or sooner as needed.  Patient was given instructions and counseling regarding her condition or for health maintenance advice. Please see specific information pulled into the AVS if appropriate.     Transcribed from ambient dictation for MYNOR Mcnulty by Nirmala Pederson.  12/13/22   09:18 CST    Patient or patient representative verbalized consent to the visit recording.  I have personally performed the services described in this document as transcribed by the above individual, and it is both accurate and  complete.  Kaylee Osullivan, APRN  12/13/2022  13:02 CST

## 2023-01-11 DIAGNOSIS — M54.50 LOW BACK PAIN, UNSPECIFIED BACK PAIN LATERALITY, UNSPECIFIED CHRONICITY, UNSPECIFIED WHETHER SCIATICA PRESENT: Primary | ICD-10-CM

## 2023-01-17 ENCOUNTER — OFFICE VISIT (OUTPATIENT)
Dept: ORTHOPEDIC SURGERY | Facility: CLINIC | Age: 38
End: 2023-01-17
Payer: COMMERCIAL

## 2023-01-17 VITALS — WEIGHT: 129 LBS | HEIGHT: 64 IN | BODY MASS INDEX: 22.02 KG/M2

## 2023-01-17 DIAGNOSIS — M54.42 ACUTE LEFT-SIDED LOW BACK PAIN WITH LEFT-SIDED SCIATICA: Primary | ICD-10-CM

## 2023-01-17 PROCEDURE — 99213 OFFICE O/P EST LOW 20 MIN: CPT | Performed by: NURSE PRACTITIONER

## 2023-01-17 PROCEDURE — 96372 THER/PROPH/DIAG INJ SC/IM: CPT | Performed by: NURSE PRACTITIONER

## 2023-01-17 RX ORDER — TRIAMCINOLONE ACETONIDE 40 MG/ML
80 INJECTION, SUSPENSION INTRA-ARTICULAR; INTRAMUSCULAR ONCE
Status: COMPLETED | OUTPATIENT
Start: 2023-01-17 | End: 2023-01-17

## 2023-01-17 RX ADMIN — TRIAMCINOLONE ACETONIDE 80 MG: 40 INJECTION, SUSPENSION INTRA-ARTICULAR; INTRAMUSCULAR at 09:58

## 2023-01-17 NOTE — PROGRESS NOTES
Mary Ann Mondragon is a 37 y.o. female   Primary provider:  Kaylee Osullivan APRN       Chief Complaint   Patient presents with   • Lumbar Spine - Initial Evaluation, Pain       HISTORY OF PRESENT ILLNESS:    Ms. Mondragon is a 37-year-old female who presents today with complaints of left lower back pain that started 15 days ago while doing yoga at home.  Pain is mild and constant.  Pain started midline and then moved to the left side of her back.  She has now occasionally having pain rating down her left posterior leg to the level of her thigh just below the knee.  She reports at first pain was significant and has actually gotten quite a bit better over the past 2 weeks.  Sitting and driving aggravate pain.  Ice and anti-inflammatories do help some with symptoms.        Back Pain  This is a new problem. Episode onset: 2 weeks  The problem occurs constantly. The pain is present in the lumbar spine. The quality of the pain is described as aching and stabbing. The pain radiates to the left thigh. The pain is at a severity of 2/10. The pain is mild. The pain is the same all the time. The symptoms are aggravated by sitting, bending and twisting. She has tried NSAIDs and ice for the symptoms.        CONCURRENT MEDICAL HISTORY:    Past Medical History:   Diagnosis Date   • Abdominal pain, epigastric    • Acquired hypothyroidism    • Acute pharyngitis    • Carbuncle/boil     left axilla   • Contact dermatitis    • Contraception    • Cough    • Dysmenorrhea    • Dysuria    • Encounter for gynecological examination (general) (routine) without abnormal findings    • Epigastric pain    • Fatigue    • GERD (gastroesophageal reflux disease)    • Hashimoto's thyroiditis    • Heartburn    • Increased frequency of urination    • Knee pain    • Multiple joint pain    • Muscle pain    • Nausea    • Neoplasm of ovary    • Pain in wrist     Right wrist.   • Pelvic pain    • Potential Infectious disease contact    • Special  examinations and investigations     Gynecological examination   • Surveillance of oral contraception done    • Syncope    • Upper respiratory infection    • Visit for gynecologic examination    • Vitamin D deficiency    • Vitamin deficiency        Allergies   Allergen Reactions   • Omeprazole Hives     (Prilosec)         Current Outpatient Medications:   •  famotidine (PEPCID) 20 MG tablet, , Disp: , Rfl:   •  Multiple Vitamins-Minerals (Multivitamin Adult, Minerals,) tablet, Take 1 capsule by mouth Daily., Disp: , Rfl:   •  norgestimate-ethinyl estradiol (Tri-Sprintec) 0.18/0.215/0.25 MG-35 MCG per tablet, Take 1 tablet by mouth Daily., Disp: 84 tablet, Rfl: 4  •  pantoprazole (PROTONIX) 40 MG EC tablet, , Disp: , Rfl:   •  Thyroid 90 MG PO tablet, Take half a tab daily, GENERIC OK, Disp: 90 tablet, Rfl: 3  •  valACYclovir (Valtrex) 1000 MG tablet, Take 1 tablet by mouth 2 (Two) Times a Day., Disp: 20 tablet, Rfl: 0  •  fluticasone (FLONASE) 50 MCG/ACT nasal spray, 2 sprays into the nostril(s) as directed by provider Daily., Disp: , Rfl:   •  lidocaine (XYLOCAINE) 5 % ointment, Apply 1 application topically to the appropriate area as directed Every 2 (Two) Hours As Needed for Mild Pain., Disp: 50 g, Rfl: 2  •  LORazepam (ATIVAN) 0.5 MG tablet, Take 1 tablet by mouth 2 (Two) Times a Day As Needed for Anxiety., Disp: 10 tablet, Rfl: 0  •  promethazine (PHENERGAN) 25 MG tablet, Take 1 tablet by mouth Every 6 (Six) Hours As Needed for Nausea or Vomiting., Disp: 20 tablet, Rfl: 0  No current facility-administered medications for this visit.    Past Surgical History:   Procedure Laterality Date   • ESOPHAGOSCOPY / EGD  06/08/2015    Normal esophagus. Gastritis found in the stomach. Biopsy taken. Normal duodenum.   • TONSILLECTOMY AND ADENOIDECTOMY     • TYMPANOSTOMY TUBE PLACEMENT         Family History   Problem Relation Age of Onset   • Ovarian cancer Mother    • Ovarian cancer Other    • Stomach cancer Other    • CHRISTIANA  "disease Other    • Colon cancer Maternal Grandfather        Social History     Socioeconomic History   • Marital status: Single   Tobacco Use   • Smoking status: Never   • Smokeless tobacco: Never   Substance and Sexual Activity   • Alcohol use: Yes     Comment: Occasionally   • Drug use: Never   • Sexual activity: Yes     Partners: Male     Birth control/protection: Pill        Review of Systems   Musculoskeletal: Positive for back pain.       PHYSICAL EXAMINATION:       Ht 162.6 cm (64\")   Wt 58.5 kg (129 lb)   BMI 22.14 kg/m²     Physical Exam  Vitals and nursing note reviewed.   Constitutional:       General: She is not in acute distress.     Appearance: She is well-developed. She is not toxic-appearing or diaphoretic.   HENT:      Head: Normocephalic.   Eyes:      General: No scleral icterus.  Pulmonary:      Effort: Pulmonary effort is normal. No respiratory distress.   Musculoskeletal:      Lumbar back: Tenderness present. No swelling, deformity, lacerations or bony tenderness. Positive left straight leg raise test. Negative right straight leg raise test.        Back:       Comments: Tenderness and muscle of left low back.  No spinous process tenderness.  Bilateral lower leg strength is equal.  Bilateral patellar reflexes are normal   Skin:     General: Skin is warm and dry.   Neurological:      Mental Status: She is alert and oriented to person, place, and time.   Psychiatric:         Behavior: Behavior normal.         Thought Content: Thought content normal.         Judgment: Judgment normal.         GAIT:     []  Normal  []  Antalgic    Assistive device: []  None  []  Walker     []  Crutches  []  Cane     []  Wheelchair  []  Stretcher    Back Exam     Tests   Straight leg raise right: negative  Straight leg raise left: positive                        ASSESSMENT:    Diagnoses and all orders for this visit:    Acute left-sided low back pain with left-sided sciatica  -     triamcinolone acetonide " (KENALOG-40) injection 80 mg  -     Cancel: Ambulatory Referral to Physical Therapy Evaluate and treat; (as indicated ); Stretching, ROM, Strengthening  -     Ambulatory Referral to Physical Therapy Evaluate and treat; (as indicated ); Stretching, ROM, Strengthening          PLAN    X-rays reviewed, no significant osteoarthritis noted.  Patient does have positive straight leg raise.  Patient symptoms have greatly improved over the past 2 weeks with conservative management.  After discussing available treatment options including risk, benefits, alternatives, patient desires to proceed with IM Kenalog and physical therapy order to teach HEP.  Patient is to return in 4 to 6 weeks for recheck, if not improving we will likely proceed with MRI.  Patient is to return sooner if symptoms worsen.  Red flag symptoms including when to seek emergency care explained to patient.  Patient verbalized understanding.    Return in about 4 weeks (around 2/14/2023).    EMR Dragon/Transciption Disclaimer: Some of this note may be an electronic transcription/translation of spoken language to printed text.  The electronic translation of spoken language may permit erroneous, or at times, nonsensical words or phrases to be inadvertently transcribed. Although I have reviewed the note for such errors, some may still exist.       This document has been electronically signed by Alayna LOWE on January 17, 2023 12:00 CST

## 2023-01-24 ENCOUNTER — HOSPITAL ENCOUNTER (OUTPATIENT)
Dept: PHYSICAL THERAPY | Facility: HOSPITAL | Age: 38
Setting detail: THERAPIES SERIES
Discharge: HOME | End: 2023-01-24
Payer: COMMERCIAL

## 2023-01-24 DIAGNOSIS — M54.42 ACUTE LEFT-SIDED LOW BACK PAIN WITH LEFT-SIDED SCIATICA: Primary | ICD-10-CM

## 2023-01-24 PROCEDURE — 97161 PT EVAL LOW COMPLEX 20 MIN: CPT

## 2023-01-24 NOTE — THERAPY EVALUATION
Outpatient Physical Therapy Ortho Initial Evaluation  [facility]     Patient Name: Mary Ann Mondragon  : 1985  MRN: [id number]  Today's Date: 2023      Visit Date: 2023   Attendance:  (90 approved. 2 visits via provider)  Subjective Improvement: n/a  Next MD Visit: 23  Recert Date: 23    Therapy Diagnosis: Acute left LBP      Patient Active Problem List   Diagnosis   • Vitamin D deficiency   • B12 deficiency   • Hypothyroidism due to Hashimoto's thyroiditis   • Screening for ovarian cancer   • Lump of skin of lower extremity, bilateral   • HSV infection   • Anxiety   • Fear of flying        Past Medical History:   Diagnosis Date   • Abdominal pain, epigastric    • Acquired hypothyroidism    • Acute pharyngitis    • Carbuncle/boil     left axilla   • Contact dermatitis    • Contraception    • Cough    • Dysmenorrhea    • Dysuria    • Encounter for gynecological examination (general) (routine) without abnormal findings    • Epigastric pain    • Fatigue    • GERD (gastroesophageal reflux disease)    • Hashimoto's thyroiditis    • Heartburn    • Increased frequency of urination    • Knee pain    • Multiple joint pain    • Muscle pain    • Nausea    • Neoplasm of ovary    • Pain in wrist     Right wrist.   • Pelvic pain    • Potential Infectious disease contact    • Special examinations and investigations     Gynecological examination   • Surveillance of oral contraception done    • Syncope    • Upper respiratory infection    • Visit for gynecologic examination    • Vitamin D deficiency    • Vitamin deficiency         Past Surgical History:   Procedure Laterality Date   • ESOPHAGOSCOPY / EGD  2015    Normal esophagus. Gastritis found in the stomach. Biopsy taken. Normal duodenum.   • TONSILLECTOMY AND ADENOIDECTOMY     • TYMPANOSTOMY TUBE PLACEMENT       Current Outpatient Medications   Medication Instructions   • famotidine (PEPCID) 20 MG tablet No dose, route, or  frequency recorded.   • fluticasone (FLONASE) 50 MCG/ACT nasal spray 2 sprays, Nasal, Daily   • lidocaine (XYLOCAINE) 5 % ointment 1 application, Topical, Every 2 Hours PRN   • LORazepam (ATIVAN) 0.5 mg, Oral, 2 Times Daily PRN   • Multiple Vitamins-Minerals (Multivitamin Adult, Minerals,) tablet 1 capsule, Oral, Daily   • norgestimate-ethinyl estradiol (Tri-Sprintec) 0.18/0.215/0.25 MG-35 MCG per tablet 1 tablet, Oral, Daily   • pantoprazole (PROTONIX) 40 MG EC tablet No dose, route, or frequency recorded.   • promethazine (PHENERGAN) 25 mg, Oral, Every 6 Hours PRN   • Thyroid 90 MG PO tablet Take half a tab daily, GENERIC OK   • valACYclovir (VALTREX) 1,000 mg, Oral, 2 Times Daily       Allergies   Allergen Reactions   • Omeprazole Hives     (Prilosec)         Visit Dx:     ICD-10-CM ICD-9-CM   1. Acute left-sided low back pain with left-sided sciatica  M54.42 724.2     724.3          Patient History     Row Name 01/24/23 0800             History    Chief Complaint Pain  -      Type of Pain Back pain  Left low back  -      Date Current Problem(s) Began 01/02/23  -      Brief Description of Current Complaint Pt reports that she started to experience left LBP a few weeks ago while performing yoga at home. Pt reports that her symptoms were mild at first at the conclusion of her yoga and they increased as the day went on. She has since had a steroid injection which has helped. Her symptoms have improved some with time. She presents today to eliminate all of her symptoms and learn ways to improve her core/back and prevent future injuries. Single female living alone in a multi-level home.  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 Hobbies: cycling, swimming, paining, reading, traveling  -      What clinical tests have you had for this problem? X-ray  -      Results of Clinical Tests Per imaging report of l-spine x-ray on 1/11/23: “There is a normal lordosis. 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 Vertebral height, disc spaces and alignment are maintained. No fracture. The pedicles are intact.”  -         Pain     Pain Location Back  left low back  -      Pain at Present 0  -      Pain at Best 0  -      Pain at Worst 3  -MH      Pain Frequency Intermittent  -      Pain Description Sharp  -      What Performance Factors Make the Current Problem(s) WORSE? standing up, prolonged sitting (mostly in car), sleeping, bending forward, and rotation.  -      What Performance Factors Make the Current Problem(s) BETTER? Rest, heat  -      Tolerance Time- Standing n/a  -      Tolerance Time- Sitting Never fully comfortable  -      Tolerance Time- Walking Occasional pain  -      Is your sleep disturbed? No  -      Is medication used to assist with sleep? No  -      Difficulties at work? Sitting  -      Difficulties with ADL's? Dressing and bathing (bending over motion) - has improved  -      Difficulties with recreational activities? Some mild limitations but can still participate  -            User Key  (r) = Recorded By, (t) = Taken By, (c) = Cosigned By    Initials Name Provider Type     Marilyn Bee PT Physical Therapist                 PT Ortho     Row Name 01/24/23 0800       Subjective Comments    Subjective Comments See therapy pt hx  -       Subjective Pain    Able to rate subjective pain? yes  -    Pre-Treatment Pain Level 0  -    Post-Treatment Pain Level 0  -       Posture/Observations    Posture/Observations Comments Posture: very mild slouch with rounded  "shoulder and forward head. TTP: L QL, gluteals, and piriformis. Slight increased muscle tension in the above muscles.  -       Lumbar/SI Special Tests    Slump Test (Neural Tension) Bilateral:;Negative  -    Long Sit Test (Pelvic Malalignment) Bilateral:;Negative  -    SLR (Neural Tension) Bilateral:;Negative  -       General ROM    RT Lower Ext Comment  -MH    LT Lower Ext Comment  -       Head/Neck/Trunk    Trunk Extension AROM 36 deg  -    Trunk Flexion AROM Fingertips 7\" from floor; tightness  -    Trunk Lt Lateral Flexion AROM WNL; pain free  -    Trunk Rt Lateral Flexion AROM WNL; pain free  -    Trunk Lt Rotation AROM WNL; pain free  -    Trunk Rt Rotation AROM WNL; pain free  -       Right Lower Ext    RT Lower Extremity Comments WNL grossly  -       Left Lower Ext    LT Lower Extremity Comments WNL grossly  -       MMT (Manual Muscle Testing)    Neck/Trunk Comments  -    Rt Lower Ext Comments  -    Lt Lower Ext Comments;Lt Ankle WNL;Lt Knee WNL;Lt Hip Flexion;Lt Hip Extension;Lt Hip ABduction;Lt Hip ADduction;Lt Hip Internal (Medial) Rotation;Lt Hip External (Lateral) Rotation  -       MMT Neck/Trunk    Neck/Trunk Comments  Slight pelvic deviation with bridges. Pt favors right. Difficulty maintaining core engagement with SLR flex bilat.  -       MMT Right Lower Ext    Rt Lower Extremity Comments  5/5 grossly  -       MMT Left Lower Ext    Lt Hip Flexion MMT, Gross Movement (4/5) good  -    Lt Hip Extension MMT, Gross Movement (4/5) good  -    Lt Hip ABduction MMT, Gross Movement (5/5) normal  -    Lt Hip ADduction MMT, Gross Movement (4+/5) good plus  -    Lt Hip Internal (Medial) Rotation MMT, Gross Movement (5/5) normal  -    Lt Hip External (Lateral) Rotation MMT, Gross Movement (4/5) good  -    Lt Lower Extremity Comments  No increase in pain  -       Sensation    Sensation WNL? WNL  -    Light Touch No apparent deficits  -       Flexibility    " Flexibility Tested? Lower Extremity  -       Lower Extremity Flexibility    Hamstrings Left:;Moderately limited;Right:;Mildly limited  -    Hip Flexors Bilateral:;WNL  -MH    Hip External Rotators Bilateral:;Mildly limited  -MH    Hip Internal Rotators Bilateral:;WNL  -MH    Quadratus Lumborum Bilateral:;WNL  -MH          User Key  (r) = Recorded By, (t) = Taken By, (c) = Cosigned By    Initials Name Provider Type    Marilyn Scott, PT Physical Therapist                            Therapy Education  Education Details: HEP: SLR 4-way, yoshi, dani belle, HS S  Given: HEP  Program: New  How Provided: Verbal, Demonstration, Written  Provided to: Patient  Level of Understanding: Verbalized, Demonstrated      PT OP Goals     Row Name 01/24/23 0800          PT Short Term Goals    STG Date to Achieve 02/14/23  -     STG 1 Pt will be independent with final HEP for self-management of symptoms.  -     STG 1 Progress New  -        Time Calculation    PT Goal Re-Cert Due Date 02/14/23  -           User Key  (r) = Recorded By, (t) = Taken By, (c) = Cosigned By    Initials Name Provider Type    Marilyn Scott, PT Physical Therapist                 PT Assessment/Plan     Row Name 01/24/23 0800          PT Assessment    Functional Limitations Limitation in home management;Performance in self-care ADL;Performance in work activities;Performance in leisure activities  -     Impairments Impaired flexibility;Impaired muscle endurance;Impaired muscle length;Impaired muscle power;Pain;Muscle strength;Range of motion;Posture  -     Assessment Comments Ms. Mondragon is a 36 y/o female who presents to physical therapy with acute L low back pain that has been present for a few weeks. Pain started while performing yoga at home. She reports currently experiencing pain/difficulty with standing up, prolonged sitting (mostly in car), sleeping, bending forward, and rotation. Upon evaluation, she presents with mild pain, TTP,  increased muscle tension, decreased flexibility, LLE weakness (hip), core weakness, and altered posture. Ms. Mondragon would benefit from skilled physical therapy to establish and update a HEP to address the above deficits to enable pt for self-management at home.  -     Rehab Potential Excellent  -     Patient/caregiver participated in establishment of treatment plan and goals Yes  -     Patient would benefit from skilled therapy intervention Yes  -MH        PT Plan    PT Frequency 1x/week  -     Predicted Duration of Therapy Intervention (PT) 2 weeks (2 visits)  -     Planned CPT's? PT EVAL LOW COMPLEXITY: 89489;PT RE-EVAL: 37032;PT THER PROC EA 15 MIN: 19238;PT THER ACT EA 15 MIN: 63861;PT MANUAL THERAPY EA 15 MIN: 13976;PT NEUROMUSC RE-EDUCATION EA 15 MIN: 42191;PT SELF CARE/HOME MGMT/TRAIN EA 15: 42591;PT ELECTRICAL STIM UNATTEND: ;PT HOT/COLD PACK WC NONMCARE: 22877;PT THER SUPP EA 15 MIN;PT SELF CARE/MGMT/TRAIN 15 MIN: 95200  -     PT Plan Comments Focus on HEP to address core strength. Can also work on stretching, L hip strength, posture, and core motor control. One treatment with d/c end of session to home with HEP. NO cardio equipment unless pt request due to one treatment visit.  -           User Key  (r) = Recorded By, (t) = Taken By, (c) = Cosigned By    Initials Name Provider Type     Marilyn Bee, PT Physical Therapist                   OP Exercises     Row Name 01/24/23 0800             Subjective Comments    Subjective Comments See therapy pt hx  -         Subjective Pain    Able to rate subjective pain? yes  -      Pre-Treatment Pain Level 0  -MH      Post-Treatment Pain Level 0  -         Exercise 1    Exercise Name 1 SLR 4-way  -      Sets 1 1  -MH      Reps 1 5  -MH      Additional Comments bilat  -         Exercise 2    Exercise Name 2 clams and revers clams  -      Sets 2 1  -MH      Reps 2 5  -MH      Additional Comments bilat  -         Exercise 3    Exercise  Name 3  S  -      Additional Comments demo only  -            User Key  (r) = Recorded By, (t) = Taken By, (c) = Cosigned By    Initials Name Provider Type     Marilyn Bee, PT Physical Therapist                              Outcome Measure Options: Modified Oswestry  Modified Oswestry  Modified Oswestry Score/Comments: 6/50=12%      Time Calculation:     Start Time: 0800  Stop Time: 0840  Time Calculation (min): 40 min  Untimed Charges  PT Eval/Re-eval Minutes: 40  Total Minutes  Untimed Charges Total Minutes: 40   Total Minutes: 40     Therapy Charges for Today     Code Description Service Date Service Provider Modifiers Qty    85275641121 HC PT EVAL LOW COMPLEXITY 3 1/24/2023 Marilyn Bee, PT GP 1          PT G-Codes  Outcome Measure Options: Modified Oswestry  Modified Oswestry Score/Comments: 6/50=12%         Marilyn Bee PT, DPT  1/24/2023

## 2023-01-31 ENCOUNTER — APPOINTMENT (OUTPATIENT)
Dept: PHYSICAL THERAPY | Facility: HOSPITAL | Age: 38
End: 2023-01-31
Payer: COMMERCIAL

## 2023-02-07 ENCOUNTER — HOSPITAL ENCOUNTER (OUTPATIENT)
Dept: PHYSICAL THERAPY | Facility: HOSPITAL | Age: 38
Setting detail: THERAPIES SERIES
Discharge: HOME | End: 2023-02-07
Payer: COMMERCIAL

## 2023-02-07 DIAGNOSIS — M54.42 ACUTE LEFT-SIDED LOW BACK PAIN WITH LEFT-SIDED SCIATICA: Primary | ICD-10-CM

## 2023-02-07 PROCEDURE — 97110 THERAPEUTIC EXERCISES: CPT

## 2023-02-07 NOTE — THERAPY DISCHARGE NOTE
Outpatient Physical Therapy Ortho Treatment Note/Discharge Summary  [facility]     Patient Name: Mary Ann Mondragon  : 1985  MRN: [id number]  Today's Date: 2023      Visit Date: 2023  Attendance:  (90 approved. 2 visits via provider)  Subjective Improvement: 100%  Next MD Visit: 23  Recert Date: d/c     Therapy Diagnosis: Acute left LBP    Visit Dx:    ICD-10-CM ICD-9-CM   1. Acute left-sided low back pain with left-sided sciatica  M54.42 724.2     724.3       Patient Active Problem List   Diagnosis   • Vitamin D deficiency   • B12 deficiency   • Hypothyroidism due to Hashimoto's thyroiditis   • Screening for ovarian cancer   • Lump of skin of lower extremity, bilateral   • HSV infection   • Anxiety   • Fear of flying        Past Medical History:   Diagnosis Date   • Abdominal pain, epigastric    • Acquired hypothyroidism    • Acute pharyngitis    • Carbuncle/boil     left axilla   • Contact dermatitis    • Contraception    • Cough    • Dysmenorrhea    • Dysuria    • Encounter for gynecological examination (general) (routine) without abnormal findings    • Epigastric pain    • Fatigue    • GERD (gastroesophageal reflux disease)    • Hashimoto's thyroiditis    • Heartburn    • Increased frequency of urination    • Knee pain    • Multiple joint pain    • Muscle pain    • Nausea    • Neoplasm of ovary    • Pain in wrist     Right wrist.   • Pelvic pain    • Potential Infectious disease contact    • Special examinations and investigations     Gynecological examination   • Surveillance of oral contraception done    • Syncope    • Upper respiratory infection    • Visit for gynecologic examination    • Vitamin D deficiency    • Vitamin deficiency         Past Surgical History:   Procedure Laterality Date   • ESOPHAGOSCOPY / EGD  2015    Normal esophagus. Gastritis found in the stomach. Biopsy taken. Normal duodenum.   • TONSILLECTOMY AND ADENOIDECTOMY     • TYMPANOSTOMY TUBE  "PLACEMENT          PT Ortho     Row Name 02/07/23 0800       Subjective Comments    Subjective Comments Pt reports that her back has been doing well since her last visit. She has been able to return to her workouts at the [facility].  -       Subjective Pain    Able to rate subjective pain? yes  -    Pre-Treatment Pain Level 0  -    Post-Treatment Pain Level 0  -       Posture/Observations    Posture/Observations Comments No pelvic deviation/rocking with bridges. Able to maintain core contraction with SLR flex bilat.  -       Head/Neck/Trunk    Trunk Extension AROM WFL  -    Trunk Flexion AROM Fingertips 1\" from ground (WFL)  -       Lower Extremity Flexibility    Hamstrings Bilateral:;WNL  -    Hip External Rotators Bilateral:;Mildly limited  -          User Key  (r) = Recorded By, (t) = Taken By, (c) = Cosigned By    Initials Name Provider Type    Marilyn Scott, PT Physical Therapist                             PT Assessment/Plan     Row Name 02/07/23 0800          PT Assessment    Functional Limitations --  -     Impairments Impaired flexibility;Muscle strength;Posture  -     Assessment Comments Pt is doing fantastic without reports of any back symptoms. Focused today on reviewing/advancing exercises from previous HEP. Added to HEP (see flow sheet) with focus on core strength. Pt demonstrated impeccable form with all new and repeated exercises. No reports of pain throughout session. At this time, pt is d/c from skilled PT to home with HEP.  -     Rehab Potential Excellent  -     Patient/caregiver participated in establishment of treatment plan and goals Yes  -        PT Plan    Predicted Duration of Therapy Intervention (PT) d/c  -     PT Plan Comments d/c home with [facility]  -           User Key  (r) = Recorded By, (t) = Taken By, (c) = Cosigned By    Initials Name Provider Type    Marilyn Scott, PT Physical Therapist                     OP Exercises     Row Name 02/07/23 0800       "       Subjective Comments    Subjective Comments Pt reports that her back has been doing well since her last visit. She has been able to return to her workouts at the [facility].  -         Subjective Pain    Able to rate subjective pain? yes  -      Pre-Treatment Pain Level 0  -MH      Post-Treatment Pain Level 0  -MH         Total Minutes    82260 - PT Therapeutic Exercise Minutes 36  -MH         Exercise 1    Exercise Name 1 Standing HS S  -MH      Sets 1 3  -MH      Time 1 30 sec hold  -MH      Additional Comments bilat  -         Exercise 2    Exercise Name 2 Trunk flex S  -MH      Sets 2 1  -MH      Reps 2 10  -MH      Time 2 10 sec hold  -MH         Exercise 3    Exercise Name 3 Supine fig-4 S  -MH      Sets 3 3  -MH      Time 3 30 sec hold  -MH      Additional Comments bilat  -         Exercise 4    Exercise Name 4 tband SLR 4-way with TA  -MH      Sets 4 1  -MH      Reps 4 10 each  -      Additional Comments bilat BTB  -         Exercise 5    Exercise Name 5 Clams and revers clams with tband  -      Sets 5 1  -MH      Reps 5 10 each  -      Additional Comments bilat BTB  -         Exercise 6    Exercise Name 6 Bridges with tband  -      Sets 6 2  -MH      Reps 6 10  -MH      Time 6 3 sec hold  -      Additional Comments BTB  -         Exercise 7    Exercise Name 7 Dead bugs  -      Sets 7 2  -MH      Reps 7 10  -MH         Exercise 8    Exercise Name 8 Alt Bird dogs  -MH      Sets 8 2  -MH      Reps 8 10  -MH         Exercise 9    Exercise Name 9 HEP review  -            User Key  (r) = Recorded By, (t) = Taken By, (c) = Cosigned By    Initials Name Provider Type     Marilyn Bee, PT Physical Therapist                                PT OP Goals     Row Name 02/07/23 0800          PT Short Term Goals    STG Date to Achieve 02/14/23  -     STG 1 Pt will be independent with final HEP for self-management of symptoms.  -     STG 1 Progress Met   -        Time Calculation    PT  Goal Re-Cert Due Date --  d/c home  -           User Key  (r) = Recorded By, (t) = Taken By, (c) = Cosigned By    Initials Name Provider Type    Marilyn Scott PT Physical Therapist                Therapy Education  Education Details: Final HEP: HS S. tband added to: SLR 4-way, clams, reverse clams. Added bridges with tband, deadbugs, bird dogs, trunk flex S, and hip ER S  Given: HEP  Program: New  How Provided: Verbal, Demonstration, Written  Provided to: Patient  Level of Understanding: Verbalized, Demonstrated              Time Calculation:   Start Time: 0803  Stop Time: 0839  Time Calculation (min): 36 min  Timed Charges  45076 - PT Therapeutic Exercise Minutes: 36  Total Minutes  Timed Charges Total Minutes: 36   Total Minutes: 36                   Mairlyn Bee PT, DPT  2/7/2023

## 2023-02-14 ENCOUNTER — APPOINTMENT (OUTPATIENT)
Dept: PHYSICAL THERAPY | Facility: HOSPITAL | Age: 38
End: 2023-02-14
Payer: COMMERCIAL

## 2023-02-15 RX ORDER — VALACYCLOVIR HYDROCHLORIDE 1 G/1
1000 TABLET, FILM COATED ORAL 2 TIMES DAILY
Qty: 20 TABLET | Refills: 0 | Status: SHIPPED | OUTPATIENT
Start: 2023-02-15

## 2023-05-08 ENCOUNTER — OFFICE VISIT (OUTPATIENT)
Dept: FAMILY MEDICINE CLINIC | Facility: CLINIC | Age: 38
End: 2023-05-08
Payer: COMMERCIAL

## 2023-05-08 VITALS
SYSTOLIC BLOOD PRESSURE: 122 MMHG | WEIGHT: 125.4 LBS | TEMPERATURE: 98.3 F | DIASTOLIC BLOOD PRESSURE: 78 MMHG | HEIGHT: 64 IN | HEART RATE: 88 BPM | BODY MASS INDEX: 21.41 KG/M2 | OXYGEN SATURATION: 99 %

## 2023-05-08 DIAGNOSIS — T75.3XXS MOTION SICKNESS, SEQUELA: ICD-10-CM

## 2023-05-08 DIAGNOSIS — B00.9 HSV INFECTION: ICD-10-CM

## 2023-05-08 DIAGNOSIS — F41.9 ANXIETY: Primary | ICD-10-CM

## 2023-05-08 DIAGNOSIS — F40.243 FEAR OF FLYING: ICD-10-CM

## 2023-05-08 RX ORDER — PROMETHAZINE HYDROCHLORIDE 25 MG/1
25 TABLET ORAL EVERY 6 HOURS PRN
Qty: 20 TABLET | Refills: 0 | Status: SHIPPED | OUTPATIENT
Start: 2023-05-08

## 2023-05-08 RX ORDER — LORAZEPAM 0.5 MG/1
0.5 TABLET ORAL 2 TIMES DAILY PRN
Qty: 20 TABLET | Refills: 0 | Status: SHIPPED | OUTPATIENT
Start: 2023-05-08

## 2023-05-08 RX ORDER — VALACYCLOVIR HYDROCHLORIDE 1 G/1
1000 TABLET, FILM COATED ORAL 2 TIMES DAILY
Qty: 20 TABLET | Refills: 0 | Status: SHIPPED | OUTPATIENT
Start: 2023-05-08

## 2023-05-08 NOTE — PROGRESS NOTES
"Chief Complaint  Fear of flying  Answers for HPI/ROS submitted by the patient on 5/7/2023  Please describe your symptoms.: Needing medication for upcoming travel  Have you had these symptoms before?: Yes  How long have you been having these symptoms?: Greater than 2 weeks  What is the primary reason for your visit?: Other    Subjective        Mary Ann Mondragon presents to [facility] [place]  History of Present Illness  Patient here today for recheck of anxiety and fear of flying, needing premedication for her upcoming trip to [facility].  She reports that she has taken Ativan in the past which has worked well for her, low-dose.  She reports she did not have any side effects of medication and it did seem to help her get through her trip, get through the flight especially with any count of turbulence.  She reports that she has used Phenergan also previously for nausea during long flights which she will have a couple of at least on her next trip in a couple weeks.  Additionally she is here for recheck on herpes, controlled on medication, needing refills on Valtrex today.  Objective   Vital Signs:  /78   Pulse 88   Temp 98.3 °F (36.8 °C)   Ht 162.6 cm (64\")   Wt 56.9 kg (125 lb 6.4 oz)   SpO2 99%   BMI 21.52 kg/m²   Estimated body mass index is 21.52 kg/m² as calculated from the following:    Height as of this encounter: 162.6 cm (64\").    Weight as of this encounter: 56.9 kg (125 lb 6.4 oz).     BMI is within normal parameters. No other follow-up for BMI required.    Physical Exam  Vitals and nursing note reviewed.   Constitutional:       General: She is not in acute distress.     Appearance: Normal appearance. She is normal weight. She is not ill-appearing, toxic-appearing or diaphoretic.   HENT:      Head: Normocephalic and atraumatic.   Cardiovascular:      Rate and Rhythm: Normal rate and regular rhythm.      Heart sounds: Normal heart sounds. No murmur " heard.    No friction rub. No gallop.   Pulmonary:      Effort: Pulmonary effort is normal. No respiratory distress.      Breath sounds: Normal breath sounds. No stridor. No wheezing, rhonchi or rales.   Skin:     General: Skin is warm and dry.      Coloration: Skin is not jaundiced or pale.      Findings: No bruising, erythema, lesion or rash.   Neurological:      Mental Status: She is alert and oriented to person, place, and time.      Cranial Nerves: No cranial nerve deficit.      Motor: No weakness.      Coordination: Coordination normal.      Gait: Gait normal.   Psychiatric:         Mood and Affect: Mood normal.         Behavior: Behavior normal.         Thought Content: Thought content normal.         Judgment: Judgment normal.        Result Review :               Assessment and Plan   Diagnoses and all orders for this visit:    1. Anxiety (Primary)  -     LORazepam (ATIVAN) 0.5 MG tablet; Take 1 tablet by mouth 2 (Two) Times a Day As Needed for Anxiety.  Dispense: 20 tablet; Refill: 0    2. Motion sickness, sequela  -     promethazine (PHENERGAN) 25 MG tablet; Take 1 tablet by mouth Every 6 (Six) Hours As Needed for Nausea or Vomiting.  Dispense: 20 tablet; Refill: 0    3. Fear of flying  -     LORazepam (ATIVAN) 0.5 MG tablet; Take 1 tablet by mouth 2 (Two) Times a Day As Needed for Anxiety.  Dispense: 20 tablet; Refill: 0    4. HSV infection  -     valACYclovir (Valtrex) 1000 MG tablet; Take 1 tablet by mouth 2 (Two) Times a Day.  Dispense: 20 tablet; Refill: 0    She is given refills on Valtrex, I will prescribe her Phenergan and Ativan to use as directed as above.  She has used these in the past and understands potential side effects of medication.  We will use as directed, Jv obtained and reviewed.  We will follow-up here as needed problems or as scheduled for chronic conditions.  All questions and concerns addressed with understanding verbalized.  Patient aware and in agreement to this plan.     I  spent 22 minutes caring for Mary Ann on this date of service. This time includes time spent by me in the following activities:preparing for the visit, obtaining and/or reviewing a separately obtained history, performing a medically appropriate examination and/or evaluation , counseling and educating the patient/family/caregiver, ordering medications, tests, or procedures and documenting information in the medical record  Follow Up   Return if symptoms worsen or fail to improve, for Recheck, or sooner as needed.  Patient was given instructions and counseling regarding her condition or for health maintenance advice. Please see specific information pulled into the AVS if appropriate.

## 2023-06-16 ENCOUNTER — LAB (OUTPATIENT)
Dept: LAB | Facility: HOSPITAL | Age: 38
End: 2023-06-16
Payer: COMMERCIAL

## 2023-06-16 DIAGNOSIS — E53.8 B12 DEFICIENCY: ICD-10-CM

## 2023-06-16 DIAGNOSIS — E06.3 HYPOTHYROIDISM DUE TO HASHIMOTO'S THYROIDITIS: ICD-10-CM

## 2023-06-16 DIAGNOSIS — E55.9 VITAMIN D DEFICIENCY: ICD-10-CM

## 2023-06-16 DIAGNOSIS — E03.8 HYPOTHYROIDISM DUE TO HASHIMOTO'S THYROIDITIS: ICD-10-CM

## 2023-06-16 LAB
ALBUMIN SERPL-MCNC: 4.2 G/DL (ref 3.5–5.2)
ALBUMIN/GLOB SERPL: 1.5 G/DL
ALP SERPL-CCNC: 50 U/L (ref 39–117)
ALT SERPL W P-5'-P-CCNC: 10 U/L (ref 1–33)
ANION GAP SERPL CALCULATED.3IONS-SCNC: 12 MMOL/L (ref 5–15)
AST SERPL-CCNC: 19 U/L (ref 1–32)
BASOPHILS # BLD AUTO: 0.05 10*3/MM3 (ref 0–0.2)
BASOPHILS NFR BLD AUTO: 0.9 % (ref 0–1.5)
BILIRUB SERPL-MCNC: 0.2 MG/DL (ref 0–1.2)
BUN SERPL-MCNC: 18 MG/DL (ref 6–20)
BUN/CREAT SERPL: 27.3 (ref 7–25)
CALCIUM SPEC-SCNC: 9.1 MG/DL (ref 8.6–10.5)
CHLORIDE SERPL-SCNC: 101 MMOL/L (ref 98–107)
CO2 SERPL-SCNC: 24 MMOL/L (ref 22–29)
CREAT SERPL-MCNC: 0.66 MG/DL (ref 0.57–1)
DEPRECATED RDW RBC AUTO: 40.3 FL (ref 37–54)
EGFRCR SERPLBLD CKD-EPI 2021: 113.9 ML/MIN/1.73
EOSINOPHIL # BLD AUTO: 0.15 10*3/MM3 (ref 0–0.4)
EOSINOPHIL NFR BLD AUTO: 2.7 % (ref 0.3–6.2)
ERYTHROCYTE [DISTWIDTH] IN BLOOD BY AUTOMATED COUNT: 11.9 % (ref 12.3–15.4)
GLOBULIN UR ELPH-MCNC: 2.8 GM/DL
GLUCOSE SERPL-MCNC: 87 MG/DL (ref 65–99)
HCT VFR BLD AUTO: 39.3 % (ref 34–46.6)
HGB BLD-MCNC: 13 G/DL (ref 12–15.9)
IMM GRANULOCYTES # BLD AUTO: 0.01 10*3/MM3 (ref 0–0.05)
IMM GRANULOCYTES NFR BLD AUTO: 0.2 % (ref 0–0.5)
LYMPHOCYTES # BLD AUTO: 2.2 10*3/MM3 (ref 0.7–3.1)
LYMPHOCYTES NFR BLD AUTO: 39.7 % (ref 19.6–45.3)
MCH RBC QN AUTO: 30.2 PG (ref 26.6–33)
MCHC RBC AUTO-ENTMCNC: 33.1 G/DL (ref 31.5–35.7)
MCV RBC AUTO: 91.4 FL (ref 79–97)
MONOCYTES # BLD AUTO: 0.35 10*3/MM3 (ref 0.1–0.9)
MONOCYTES NFR BLD AUTO: 6.3 % (ref 5–12)
NEUTROPHILS NFR BLD AUTO: 2.78 10*3/MM3 (ref 1.7–7)
NEUTROPHILS NFR BLD AUTO: 50.2 % (ref 42.7–76)
NRBC BLD AUTO-RTO: 0 /100 WBC (ref 0–0.2)
PLATELET # BLD AUTO: 198 10*3/MM3 (ref 140–450)
PMV BLD AUTO: 12.3 FL (ref 6–12)
POTASSIUM SERPL-SCNC: 4.1 MMOL/L (ref 3.5–5.2)
PROT SERPL-MCNC: 7 G/DL (ref 6–8.5)
RBC # BLD AUTO: 4.3 10*6/MM3 (ref 3.77–5.28)
SODIUM SERPL-SCNC: 137 MMOL/L (ref 136–145)
T4 FREE SERPL-MCNC: 0.84 NG/DL (ref 0.93–1.7)
TSH SERPL DL<=0.05 MIU/L-ACNC: 2.8 UIU/ML (ref 0.27–4.2)
WBC NRBC COR # BLD: 5.54 10*3/MM3 (ref 3.4–10.8)

## 2023-06-16 PROCEDURE — 80050 GENERAL HEALTH PANEL: CPT

## 2023-06-16 PROCEDURE — 84439 ASSAY OF FREE THYROXINE: CPT

## 2023-06-16 PROCEDURE — 84481 FREE ASSAY (FT-3): CPT

## 2023-06-16 PROCEDURE — 36415 COLL VENOUS BLD VENIPUNCTURE: CPT

## 2023-06-17 LAB — T3FREE SERPL-MCNC: 3.13 PG/ML (ref 2–4.4)

## 2023-08-14 RX ORDER — PANTOPRAZOLE SODIUM 40 MG/1
TABLET, DELAYED RELEASE ORAL
OUTPATIENT
Start: 2023-08-14

## 2023-08-16 DIAGNOSIS — T75.3XXS MOTION SICKNESS, SEQUELA: ICD-10-CM

## 2023-08-16 RX ORDER — PROMETHAZINE HYDROCHLORIDE 25 MG/1
25 TABLET ORAL EVERY 6 HOURS PRN
Qty: 20 TABLET | Refills: 0 | Status: SHIPPED | OUTPATIENT
Start: 2023-08-16

## 2023-08-21 RX ORDER — PANTOPRAZOLE SODIUM 40 MG/1
40 TABLET, DELAYED RELEASE ORAL DAILY
Qty: 30 TABLET | Refills: 5 | Status: SHIPPED | OUTPATIENT
Start: 2023-08-21